# Patient Record
Sex: FEMALE | Race: BLACK OR AFRICAN AMERICAN | Employment: FULL TIME | URBAN - METROPOLITAN AREA
[De-identification: names, ages, dates, MRNs, and addresses within clinical notes are randomized per-mention and may not be internally consistent; named-entity substitution may affect disease eponyms.]

---

## 2019-12-30 ENCOUNTER — HOSPITAL ENCOUNTER (OUTPATIENT)
Dept: PHYSICAL THERAPY | Age: 54
Discharge: HOME | End: 2019-12-30
Payer: COMMERCIAL

## 2019-12-30 PROCEDURE — 97110 THERAPEUTIC EXERCISES: CPT

## 2019-12-30 PROCEDURE — 97161 PT EVAL LOW COMPLEX 20 MIN: CPT

## 2019-12-30 NOTE — PROGRESS NOTES
[address] PHYSICAL THERAPY AT [address] [facility], [address] [place] [address] Way, [address], [address] [place] [address] - Phone: [phone]  Fax: [phone] / [address] [facility]  Patient Name: Chay Louis : 1965   Medical   Diagnosis: Left shoulder pain [M25.512]  Right shoulder pain [M25.511] Treatment Diagnosis: Bilateral Shoulder Instability, Impingement   Onset Date: 10/2019     Referral Source: Adele Elam MD [facility]): 2019   Prior Hospitalization: See medical history Provider #: [id number]   Prior Level of Function: Hx bilateral RCR ,, left dislocation    Comorbidities: Depression,OA, HTN   Medications: Verified on Patient Summary List   The Plan of Care and following information is based on the information from the initial evaluation.   ===========================================================================================  Assessment / key information:  Chay Louis is a 53 y.o.  yo female with Dx of Left shoulder pain [M25.512]  Right shoulder pain [M25.511]. Patient reports onset of symptoms in right shoulder in October of this year after a fall involving her dog. Patient experienced a seconds fall on 19 with right shoulder dislocation. History of bilateral RCR and dislocation. Patient currently rates pain as 10/10 at worst, 2/10 at best, primarily located at bilateral shoulders. Patient complains of difficulty and increase pain with reaching and lifting. Objective Findings:  Right Shoulder AROM:Flexion= 110 degrees, scaption= 93 degrees, ER= 72 degrees, IR to L5 thumb behind back. Left Shoulder AROM:Flexion= 168 degrees, scaption= 155 degrees, ER= 73 degrees, IR to T5 thumb behind back. Manual Muscle Testing: bilateral shoulders grossl 4/5. Special Test: + Yeseniaann Hayden, Speeds and Empty Can bilaterally. DID not test for instability due to recent dislocation.  FOTO Score= 54 points. Pt instructed in HEP and will f/u in clinic for PT.  ===========================================================================================  Eval Complexity: History HIGH Complexity :3+ comorbidities / personal factors will impact the outcome/ POC ;  Examination  HIGH Complexity : 4+ Standardized tests and measures addressing body structure, function, activity limitation and / or participation in recreation ; Presentation LOW Complexity : Stable, uncomplicated ;  Decision Making MEDIUM Complexity : FOTO score of 26-74; Overall Complexity LOW   Problem List: pain affecting function, decrease ROM, decrease strength, edema affecting function, impaired gait/ balance, decrease ADL/ functional abilitiies and decrease activity tolerance   Treatment Plan may include any combination of the following: Therapeutic exercise, Therapeutic activities, Neuromuscular re-education, Physical agent/modality, Manual therapy and Patient education  Patient / Family readiness to learn indicated by: asking questions, trying to perform skills and interest  Persons(s) to be included in education: patient  Barriers to Learning/Limitations: None  Measures taken, if barriers to learning:    Patient Goal (s): \"\"Strengthen, decrease pain. \"   Patient self reported health status: excellent  Rehabilitation Potential: good   Short Term Goals: To be accomplished in  2  weeks:  1. Patient will achieve +2 on GROC to improve reach. 2. Patient will increase FOTO Score to 61 points to improve reach. 3. Patient will report 3/10 pain at worst to improve reach.  Long Term Goals: To be accomplished in  4  weeks:  1. Patient will achieve +4 on GROC to improve reach. 2.  Patient will increase FOTO Score to 68 points to improve reach. 3.  Patient will report 75% improvement in symptoms to improve lifting overhead.   Frequency / Duration:   Patient to be seen  2-3  times per week for 4  weeks:  Patient / Caregiver education and instruction: activity modification and exercises  Therapist Signature: RANCHO Sagastume Date: 04/80/7340   Certification Period: n/a Time: 2:31 PM   ===========================================================================================  I certify that the above Physical Therapy Services are being furnished while the patient is under my care. I agree with the treatment plan and certify that this therapy is necessary. Physician Signature:        Date:       Time:     Please sign and return to [facility] or you may fax the signed copy to [phone]. Thank you.

## 2019-12-30 NOTE — PROGRESS NOTES
PHYSICAL THERAPY - DAILY TREATMENT NOTE      Patient Name: Traci Hobbs        Date: 2019  : 1965   YES Patient  Verified  Visit #:   1   of   5  Insurance: Payor: Aracely Taveras / Plan: Rene Fabian RPN / Product Type: Commerical /      In time: 1:00 Out time: 1:35   Total Treatment Time: 35     Medicare Time/BCBS Tracking (below)   Total Timed Codes (min):  n/a 1:1 Treatment Time:  n/a     TREATMENT AREA = Left shoulder pain [M25.512]  Right shoulder pain [M25.511]     SUBJECTIVE    Pain Level (on 0 to 10 scale):  2   10   Medication Changes/New allergies or changes in medical history, any new surgeries or procedures? NO    If yes, update Summary List   Subjective Functional Status/Changes:  []  No changes reported       See Plan of Care       OBJECTIVE    10 min Therapeutic Exercise:  [x]  See flow sheet   Rationale:      increase ROM and increase strength to improve the patients ability to reach     Billed With/As:   [x] TE   [] TA   [] Neuro   [] Self Care Patient Education: [x] Review HEP    [] Progressed/Changed HEP based on:   [] positioning   [] body mechanics   [] transfers   [] heat/ice application    [] other:      Other Objective/Functional Measures:    See Plan of Care     Post Treatment Pain Level (on 0 to 10) scale:   3   10     ASSESSMENT    Assessment/Changes in Function:     See Plan of Care     []  See Progress Note/Recertification   Patient will continue to benefit from skilled PT services to modify and progress therapeutic interventions, address functional mobility deficits, address ROM deficits, address strength deficits, analyze and address soft tissue restrictions, analyze and cue movement patterns, analyze and modify body mechanics/ergonomics and assess and modify postural abnormalities to attain remaining goals. to attain remaining goals.    Progress toward goals / Updated goals:    See Plan of Care     PLAN    [x]  Upgrade activities as tolerated YES Continue plan of care   []  Discharge due to :    []  Other:      Therapist: Aleta Oro PT    Date: 12/30/2019 Time: 2:19 PM     Future Appointments   Date Time Provider Tucker Lara   1/10/2020  4:00 PM Janet Raman [facility] AT [facility]   1/17/2020  4:00 PM Janet Raman [facility] AT [facility]   1/24/2020  4:00 PM Adeline Beach PT REHAB CENTER AT [facility]   1/31/2020  4:00 PM Adeline Beach PT REHAB CENTER AT [facility]

## 2020-01-10 ENCOUNTER — HOSPITAL ENCOUNTER (OUTPATIENT)
Dept: PHYSICAL THERAPY | Age: 55
Discharge: HOME | End: 2020-01-10
Payer: COMMERCIAL

## 2020-01-10 PROCEDURE — 97110 THERAPEUTIC EXERCISES: CPT

## 2020-01-10 NOTE — PROGRESS NOTES
PHYSICAL THERAPY - DAILY TREATMENT NOTE    Patient Name: Traci Hobbs        Date: 1/10/2020  : 1965    Patient  Verified: YES  Visit #:   2   of   5  Insurance: Payor: Aracely Taveras / Plan: [id number] RPN / Product Type: Commerical /      In time: 4 Out time: 4:40   Total Treatment Time: 40     Medicare/[facility] Time Tracking (below)   Total Timed Codes (min):  - 1:1 Treatment Time:  -     TREATMENT AREA/ DIAGNOSIS = Left shoulder pain [M25.512]  Right shoulder pain [M25.511]    SUBJECTIVE  Pain Level (on 0 to 10 scale):  0  / 10   Medication Changes/New allergies or changes in medical history, any new surgeries or procedures?     NO    If yes, update Summary List   Subjective Functional Status/Changes:  []  No changes reported     Functional improvement no pain at the moment   Functional limitation pain with overhead activities        OBJECTIVE  Modalities Rationale:     decrease edema, decrease inflammation and decrease pain to improve patient's ability to perform ADLs without pain   min [] Estim, type/location:                                      []  att     []  unatt     []  w/US     []  w/ice    []  w/heat    min []  Mechanical Traction: type/lbs                   []  pro   []  sup   []  int   []  cont    []  before manual    []  after manual    min []  Ultrasound, settings/location:      min []  Iontophoresis w/ dexamethasone, location:                                               []  take home patch       []  in clinic   10 min [x]  Ice     []  Heat    location/position:     min []  Vasopneumatic Device, press/temp:     min []  Other:    [x] Skin assessment post-treatment (if applicable):    [x]  intact    []  redness- no adverse reaction     []redness  adverse reaction:          30 min Therapeutic Exercise:  [x]  See flow sheet   Rationale:      increase ROM to improve the patients ability to perform ADLs without pain     Billed With/As:   [x] TE   [] TA   [] Neuro   [] Self Care Patient Education: [x] Review HEP    [] Progressed/Changed HEP based on:   [] positioning   [] body mechanics   [] transfers   [] heat/ice application    [] other:        Other Objective/Functional Measures:    Initiated POC  Full ROM  Educated pt to avoid overhead ADLs, and to use scap retraction when doing overhead ADLs   Post Treatment Pain Level (on 0 to 10) scale:   0  / 10     ASSESSMENT  Assessment/Changes in Function:     No pain with therex, needing VCs for scap retraction     []  See Progress Note/Recertification   Patient will continue to benefit from skilled PT services to modify and progress therapeutic interventions, address functional mobility deficits, address ROM deficits, address strength deficits, analyze and address soft tissue restrictions, analyze and cue movement patterns, analyze and modify body mechanics/ergonomics and assess and modify postural abnormalities to attain remaining goals.    Progress toward goals / Updated goals:    No pain with therex     PLAN  []  Upgrade activities as tolerated YES Continue plan of care   []  Discharge due to :    []  Other:      Therapist: Ricardo Rowe PT    Date: 1/10/2020 Time: 4:34 PM     Future Appointments   Date Time Provider Tucker Lara   1/17/2020  4:00 PM [facility] AT [facility]   1/24/2020  4:00 PM [facility] AT [facility]   1/31/2020  4:00 PM Nida Raman, [facility]IL [facility] AT [facility]

## 2020-01-17 ENCOUNTER — HOSPITAL ENCOUNTER (OUTPATIENT)
Dept: PHYSICAL THERAPY | Age: 55
Discharge: HOME | End: 2020-01-17
Payer: COMMERCIAL

## 2020-01-17 PROCEDURE — 97110 THERAPEUTIC EXERCISES: CPT

## 2020-01-17 NOTE — PROGRESS NOTES
PHYSICAL THERAPY - DAILY TREATMENT NOTE    Patient Name: Dmitriy Casillas        Date: 2020  : 1965    Patient  Verified: YES  Visit #:   3   of   5  Insurance: Payor: Lisha Ruano / Plan: [id number] RPN / Product Type: Commerical /      In time: 3:50 Out time: 4:40   Total Treatment Time: 50     Medicare/[facility] Time Tracking (below)   Total Timed Codes (min):  - 1:1 Treatment Time:  -     TREATMENT AREA/ DIAGNOSIS = Left shoulder pain [M25.512]  Right shoulder pain [M25.511]    SUBJECTIVE  Pain Level (on 0 to 10 scale):  0  / 10   Medication Changes/New allergies or changes in medical history, any new surgeries or procedures?     NO    If yes, update Summary List   Subjective Functional Status/Changes:  []  No changes reported     Functional improvement no pain with ADLs today   Functional limitation pain with overhead ADLs        OBJECTIVE  Modalities Rationale:     decrease edema, decrease inflammation and decrease pain to improve patient's ability to perform ADLs without pain   min [] Estim, type/location:                                      []  att     []  unatt     []  w/US     []  w/ice    []  w/heat    min []  Mechanical Traction: type/lbs                   []  pro   []  sup   []  int   []  cont    []  before manual    []  after manual    min []  Ultrasound, settings/location:      min []  Iontophoresis w/ dexamethasone, location:                                               []  take home patch       []  in clinic   10 min [x]  Ice     []  Heat    location/position:     min []  Vasopneumatic Device, press/temp:     min []  Other:    [x] Skin assessment post-treatment (if applicable):    [x]  intact    []  redness- no adverse reaction     []redness  adverse reaction:          40 min Therapeutic Exercise:  [x]  See flow sheet   Rationale:      increase ROM and increase strength to improve the patients ability to perform ADLs without pain     Billed With/As:   [x] TE   [] TA   [] Neuro   [] Self Care Patient Education: [x] Review HEP    [] Progressed/Changed HEP based on:   [] positioning   [] body mechanics   [] transfers   [] heat/ice application    [] other:        Other Objective/Functional Measures: Added chair dips (lower trap), some pain, so only did 10 reps  Also added horiz abd, pain free    Post Treatment Pain Level (on 0 to 10) scale:   0  / 10     ASSESSMENT  Assessment/Changes in Function:     Pt tolerated therex well, without pain, except for chair dips     []  See Progress Note/Recertification   Patient will continue to benefit from skilled PT services to modify and progress therapeutic interventions, address functional mobility deficits, address ROM deficits, address strength deficits, analyze and address soft tissue restrictions, analyze and cue movement patterns, analyze and modify body mechanics/ergonomics and assess and modify postural abnormalities to attain remaining goals.    Progress toward goals / Updated goals:    Good compliance with HEP     PLAN  []  Upgrade activities as tolerated YES Continue plan of care   []  Discharge due to :    []  Other:      Therapist: Wendy Prater PT    Date: 1/17/2020 Time: 3:54 PM     Future Appointments   Date Time Provider Tucker Lara   1/17/2020  4:00 PM [facility] RE[facility] AT [address]   1/24/2020  4:00 PM Isidro Net RE[facility] AT [address]   1/31/2020  4:00 PM Tanisha Duenas PT REHAB CENTER AT [address]

## 2020-01-24 ENCOUNTER — HOSPITAL ENCOUNTER (OUTPATIENT)
Dept: PHYSICAL THERAPY | Age: 55
Discharge: HOME | End: 2020-01-24
Payer: COMMERCIAL

## 2020-01-24 PROCEDURE — 97110 THERAPEUTIC EXERCISES: CPT

## 2020-01-24 NOTE — PROGRESS NOTES
PHYSICAL THERAPY - DAILY TREATMENT NOTE    Patient Name: Arnulfo Kerr        Date: 2020  : 1965    Patient  Verified: YES  Visit #:   4   of   5  Insurance: Payor: Hola Farr / Plan: [id number] RPN / Product Type: Commerical /      In time: 3:50 Out time: 4:40   Total Treatment Time: 50     Medicare/[facility] Time Tracking (below)   Total Timed Codes (min):  - 1:1 Treatment Time:  -     TREATMENT AREA/ DIAGNOSIS = Left shoulder pain [M25.512]  Right shoulder pain [M25.511]    SUBJECTIVE  Pain Level (on 0 to 10 scale):  2  / 10   Medication Changes/New allergies or changes in medical history, any new surgeries or procedures?     NO    If yes, update Summary List   Subjective Functional Status/Changes:  []  No changes reported     Functional improvement the sessions have not made me sore   Functional limitation I aggravated my L shoulder reaching overhead        OBJECTIVE  Modalities Rationale:     decrease edema, decrease inflammation and decrease pain to improve patient's ability to perform ADLs without pain   min [] Estim, type/location:                                      []  att     []  unatt     []  w/US     []  w/ice    []  w/heat    min []  Mechanical Traction: type/lbs                   []  pro   []  sup   []  int   []  cont    []  before manual    []  after manual    min []  Ultrasound, settings/location:      min []  Iontophoresis w/ dexamethasone, location:                                               []  take home patch       []  in clinic   10 min [x]  Ice     []  Heat    location/position:     min []  Vasopneumatic Device, press/temp:     min []  Other:    [x] Skin assessment post-treatment (if applicable):    [x]  intact    []  redness- no adverse reaction     []redness  adverse reaction:        40 min Therapeutic Exercise:  [x]  See flow sheet   Rationale:      increase ROM and increase strength to improve the patients ability to perform ADLs without pain     Billed With/As:   [x] TE   [] TA   [] Neuro   [] Self Care Patient Education: [x] Review HEP    [] Progressed/Changed HEP based on:   [] positioning   [] body mechanics   [] transfers   [] heat/ice application    [] other:        Other Objective/Functional Measures:    Increased L shoulder pain from lifting at work  Added ice at the end of therex   Post Treatment Pain Level (on 0 to 10) scale:   0  / 10     ASSESSMENT  Assessment/Changes in Function:     Pt with minimal pain with therex     []  See Progress Note/Recertification   Patient will continue to benefit from skilled PT services to modify and progress therapeutic interventions, address functional mobility deficits, address ROM deficits, address strength deficits, analyze and address soft tissue restrictions, analyze and cue movement patterns, analyze and modify body mechanics/ergonomics and assess and modify postural abnormalities to attain remaining goals.    Progress toward goals / Updated goals:    Less pain     PLAN  [x]  Upgrade activities as tolerated YES Continue plan of care   []  Discharge due to :    []  Other:      Therapist: Karlene Lr PT    Date: 1/24/2020 Time: 4:56 PM     Future Appointments   Date Time Provider Tucker Lara   1/31/2020  4:00 PM Javid Flores [facility] AT [facility]

## 2020-01-31 ENCOUNTER — APPOINTMENT (OUTPATIENT)
Dept: PHYSICAL THERAPY | Age: 55
End: 2020-01-31
Payer: COMMERCIAL

## 2020-02-05 ENCOUNTER — HOSPITAL ENCOUNTER (OUTPATIENT)
Dept: PHYSICAL THERAPY | Age: 55
Discharge: HOME | End: 2020-02-05
Payer: COMMERCIAL

## 2020-02-05 PROCEDURE — 97110 THERAPEUTIC EXERCISES: CPT

## 2020-02-05 NOTE — PROGRESS NOTES
Adrienne. Marco Akideandre Patrick [address]  [facility] PHYSICAL THERAPY AT [address] [facility], [address] [place] [address], [address] [place] [address], [address] [place] [address]  Phone: [phone]  Fax: [phone]  DISCHARGE SUMMARY  Patient Name: Roxana Perez : 1965   Treatment/Medical Diagnosis: Left shoulder pain [M25.512]  Right shoulder pain [M25.511]   Referral Source: Shelly Barillas MD     Date of Initial Visit: 20 Attended Visits: 5 Missed Visits: 0     SUMMARY OF TREATMENT  Treatment focused on ROM and strengthening therex to improve bilateral shoulder function. CURRENT STATUS  Patient progressing well. Reporting 80% improvement in symptoms. Patient reporting pain 3/10 at worst in left shoulder with overhead lifting. Current limitations include reach behind back and overhead. Left shoulder AROM: flexion 170 deg, scaption 170 deg, ER 80 deg, IR behind back T5. Right shoulder AROM: flexion 165 deg, scaption 165 deg, ER 90 deg, IR behind back to T9. Patient compliant with HEP. D/C to HEP at this time due to progress toward goals. Previous Goals:  1. Patient will achieve +4 on GROC to improve reach. 2.  Patient will increase FOTO Score to 68 points to improve reach. 3.  Patient will report 75% improvement in symptoms to improve lifting overhead. Prior Level/Current Level:  1) Prior Level: n/a   Current Level: +6   Goal Met? yes  2) Prior Level: FOTO 54 points   Current Level: FOTO 72 points   Goal Met? yes  3) Prior Level: n/a   Current Level: 80% improved   Goal Met? yes    RECOMMENDATIONS  Discontinue therapy. Progressing towards or have reached established goals. If you have any questions/comments please contact us directly at [phone]. Thank you for allowing us to assist in the care of your patient.     Therapist Signature: Odalis Townsend PT Date: 20     Time: 12:20 PM

## 2020-02-05 NOTE — PROGRESS NOTES
PHYSICAL THERAPY - DAILY TREATMENT NOTE      Patient Name: Wilfrid Kahn        Date: 2020  : 1965   YES Patient  Verified  Visit #:   5   of   5  Insurance: Payor: Reese Mackenzie / Plan: [id number] [employer] [address] RPN / Product Type: Commerical /      In time: 12:00 Out time: 12:50   Total Treatment Time: 50     Medicare/[facility] Time Tracking (below)   Total Timed Codes (min):  n/a 1:1 Treatment Time:  n/a     TREATMENT AREA =  Left shoulder pain [M25.512]  Right shoulder pain [M25.511]    SUBJECTIVE    Pain Level (on 0 to 10 scale):  0  / 10   Medication Changes/New allergies or changes in medical history, any new surgeries or procedures? NO    If yes, update Summary List   Subjective Functional Status/Changes:  []  No changes reported       See D/C       OBJECTIVE    50 min Therapeutic Exercise:  [x]  See flow sheet   Rationale:      increase ROM and increase strength to improve the patients ability to reach/lift     Billed With/As:   [x] TE   [] TA   [] Neuro   [] Self Care Patient Education: [x] Review HEP    [] Progressed/Changed HEP based on:   [] positioning   [] body mechanics   [] transfers   [] heat/ice application    [] other:      Other Objective/Functional Measures:    See D/C     Post Treatment Pain Level (on 0 to 10) scale:   0  / 10     ASSESSMENT    Assessment/Changes in Function:     See D/C     []  See Progress Note/Recertification   Patient will continue to benefit from skilled PT services to D/C to HEP to attain remaining goals. to attain remaining goals. Progress toward goals:    See D/C     PLAN    []  Upgrade activities as tolerated YES Continue plan of care   [x]  Discharge due to : Progress toward goals   []  Other:      Therapist: Niesha Thompson PT    Date: 2020 Time: 12:19 PM   No future appointments.

## 2020-07-22 ENCOUNTER — HOSPITAL ENCOUNTER (OUTPATIENT)
Dept: PHYSICAL THERAPY | Age: 55
Discharge: HOME | End: 2020-07-22
Payer: COMMERCIAL

## 2020-07-22 PROCEDURE — 97161 PT EVAL LOW COMPLEX 20 MIN: CPT

## 2020-07-22 PROCEDURE — 97140 MANUAL THERAPY 1/> REGIONS: CPT

## 2020-07-22 NOTE — PROGRESS NOTES
[facility] AT [facility] [address] [place], [address] - Phone: [phone]  Fax: [phone] / [address] [facility]  Patient Name: Jason Guallpa : 1965   Medical   Diagnosis: Right shoulder pain [M25.511] Treatment Diagnosis: R rotator cuff repair and Bankart repair   Onset Date: 20     Referral Source: Maria De Jesus Elizabeth MD [facility]): 2020   Prior Hospitalization: See medical history Provider #: [id number]   Prior Level of Function: History of R shoulder dislocations   Comorbidities: Depression, HBP   Medications: Verified on Patient Summary List   The Plan of Care and following information is based on the information from the initial evaluation.   ==============================================================================  Assessment / key information:   Jason Guallpa is a 53 y.o. female with a chief c/o R shoulder pain, up to 7/10, following large R rotator cuff repair/Bankart repair, on 20. The patient presents into clinic with her sling with abduction pillow, and water resistant bandages . Her R shoulder PROM:  Flex= 70 degrees, Abd = 40 degrees,, ER = 10 degrees and IR = 75 degrees. Post Op precautions were reviewed,a dn HEP was issued. One visit per week to start due to limited visits by [facility]. FOTO score = 36.  The patient would benefit from skilled physical therapy at this time.  ===========================================================================================  Eval Complexity: History LOW Complexity : Zero comorbidities / personal factors that will impact the outcome / POC;  Examination  HIGH Complexity : 4+ Standardized tests and measures addressing body structure, function, activity limitation and / or participation in recreation ; Presentation LOW Complexity : Stable, uncomplicated ;  Decision Making MEDIUM Complexity : FOTO score of 26-74; Overall Complexity LOW   Problem List: pain affecting function, decrease ROM, decrease strength, edema affecting function, decrease ADL/ functional abilitiies, decrease activity tolerance and decrease flexibility/ joint mobility   Treatment Plan may include any combination of the following: Therapeutic exercise, Therapeutic activities, Neuromuscular re-education, Physical agent/modality, Manual therapy, Patient education, Self Care training and Home safety training  Patient / Family readiness to learn indicated by: asking questions, trying to perform skills and interest  Persons(s) to be included in education: patient (P)  Barriers to Learning/Limitations: None  Measures taken, if barriers to learning:    Patient Goal (s): \"return to normal\"   Patient self reported health status: good  Rehabilitation Potential: good   Short Term Goals: To be accomplished in  2  weeks:  1. Decrease pain to < 4/10  2. Pt compliant with HEP and Post Op precautions    Long Term Goals: To be accomplished in  4-5  weeks:  1. All ADL's without R shoulder pain  2. Increase FOTO score to > 50, to indicate increased function  3. Increase R shoulder passive flexion to 90 degrees  4. Increase passive R shoulder ER to er degrees      Frequency / Duration:   Patient to be seen 2-3  times per week for 4-8  weeks:  Patient / Caregiver education and instruction: self care, activity modification, brace/ splint application and exercises  Therapist Signature: Babita Pinzon PT Date: 5/15/8886   Certification Period: NA Time: 10:32 AM   ===========================================================================================  I certify that the above Physical Therapy Services are being furnished while the patient is under my care. I agree with the treatment plan and certify that this therapy is necessary.     Physician Signature:        Date:       Time:     Please sign and return to [facility] or you may fax the signed copy to [phone]. Thank you.

## 2020-07-22 NOTE — PROGRESS NOTES
PHYSICAL THERAPY - DAILY TREATMENT NOTE    Patient Name: Maryjane Crew        Date: 2020  : 1965   YES Patient  Verified  Visit #:     Insurance: Payor: Bharat Vivi / Plan: [employer]uch[employer] RPN / Product Type: Commerical /      In time: 10 Out time: 10:40   Total Treatment Time: 40     Medicare Time Tracking (below)   Total Timed Codes (min):  20 1:1 Treatment Time:  20     TREATMENT AREA =  R shoulder    SUBJECTIVE    Pain Level (on 0 to 10 scale):  3  / 10   Medication Changes/New allergies or changes in medical history, any new surgeries or procedures?     NO    If yes, update Summary List   Subjective Functional Status/Changes:  []  No changes reported     See eval          OBJECTIVE  Modalities Rationale:     decrease edema, decrease inflammation and decrease pain to improve patient's ability to perform ADLs without pain1   min [] Estim, type/location:                                      []  att     []  unatt     []  w/US     []  w/ice    []  w/heat    min []  Mechanical Traction: type/lbs                   []  pro   []  sup   []  int   []  cont    []  before manual    []  after manual    min []  Ultrasound, settings/location:      min []  Iontophoresis w/ dexamethasone, location:                                               []  take home patch       []  in clinic   10 min [x]  Ice     []  Heat    location/position:     min []  Vasopneumatic Device, press/temp:     min []  Other:    [x] Skin assessment post-treatment (if applicable):    [x]  intact    []  redness- no adverse reaction     []redness  adverse reaction:        5 min Therapeutic Exercise:  [x]  See flow sheet   Rationale:      increase ROM to improve the patients ability to perform ADLs without pain     15 min Manual Therapy: STM to pec/post shoulder, PROM to R shoulder, R trap and levator stretch   Rationale:      decrease pain, increase ROM and increase tissue extensibility to improve patient's ability to perform ADLs without pain      Billed With/As:   [x] TE   [] TA   [] Neuro   [] Self Care Patient Education: [x] Review HEP    [] Progressed/Changed HEP based on:   [] positioning   [] body mechanics   [] transfers   [] heat/ice application    [] other:       min Patient Education:  YES  Reviewed HEP   []  Progressed/Changed HEP based on:         Other Objective/Functional Measures:    See eval     Post Treatment Pain Level (on 0 to 10) scale:   1  / 10     ASSESSMENT    []  See Progress Note/Recertification   Patient will continue to benefit from skilled therapy to address remaining functional deficits: see eval   Progress toward goals / Updated goals:    -     PLAN    [x]  Upgrade activities as tolerated YES Continue plan of care   []  Discharge due to :    []  Other:      Therapist: Jefry Bradford PT    Date: 7/22/2020 Time: 9:11 AM

## 2020-07-28 ENCOUNTER — HOSPITAL ENCOUNTER (OUTPATIENT)
Dept: PHYSICAL THERAPY | Age: 55
Discharge: HOME | End: 2020-07-28
Payer: COMMERCIAL

## 2020-07-28 PROCEDURE — 97110 THERAPEUTIC EXERCISES: CPT

## 2020-07-28 PROCEDURE — 97140 MANUAL THERAPY 1/> REGIONS: CPT

## 2020-07-28 NOTE — PROGRESS NOTES
PHYSICAL THERAPY - DAILY TREATMENT NOTE    Patient Name: Jason Guallpa        Date: 2020  : 1965   yes Patient  Verified  Visit #:   2   of   12  Insurance: Payor: Matthew Stapleton / Plan: Isi Durham RPN / Product Type: Commerical /      In time: 940 Out time: 1025   Total Treatment Time: 45     Medicare/[facility] Time Tracking (below)   Total Timed Codes (min):  35 1:1 Treatment Time:       TREATMENT AREA =  Right shoulder pain [M25.511]    SUBJECTIVE  Pain Level (on 0 to 10 scale):  2  / 10   Medication Changes/New allergies or changes in medical history, any new surgeries or procedures?    no  If yes, update Summary List   Subjective Functional Status/Changes:  []  No changes reported     Pt going to see MD tomorrow. Wants to take the bandages off. OBJECTIVE  Modalities Rationale:     decrease inflammation and decrease pain to improve patient's ability to perform pain free ADLs. min [] Estim, type/location:                                      []  att     []  unatt     []  w/US     []  w/ice    []  w/heat    min []  Mechanical Traction: type/lbs                   []  pro   []  sup   []  int   []  cont    []  before manual    []  after manual    min []  Ultrasound, settings/location:      min []  Iontophoresis w/ dexamethasone, location:                                               []  take home patch       []  in clinic   10 min [x]  Ice     []  Heat    location/position: (R) shoulder, supine. min []  Vasopneumatic Device, press/temp:     min []  Other:    [x] Skin assessment post-treatment (if applicable):    [x]  intact    []  redness- no adverse reaction     []redness  adverse reaction:        15 min Therapeutic Exercise:  [x]  See flow sheet   Rationale:      increase ROM, increase strength and improve coordination to improve the patients ability to perform pain free ADLs. 20 Min Manual Therapy: (R) shoulder PROM. TPR (R) periscapular mms.      Rationale:      decrease pain, increase ROM, increase tissue extensibility and decrease trigger points to improve patient's ability to perform pain free ADLs. Billed With/As:   [x] TE   [] TA   [] Neuro   [] Self Care Patient Education: [x] Review HEP    [] Progressed/Changed HEP based on:   [] positioning   [] body mechanics   [] transfers   [] heat/ice application    [] other:      Other Objective/Functional Measures: Therex per flow sheet. Post Treatment Pain Level (on 0 to 10) scale:   1  / 10     ASSESSMENT  Assessment/Changes in Function:     Good flex/scap/abd/ER ROM for current stage of healing. Minimal pain reported, pt took pain pill prior to session. Compliant with sling. []  See Progress Note/Recertification   Patient will continue to benefit from skilled PT services to modify and progress therapeutic interventions, address functional mobility deficits, address ROM deficits, address strength deficits, analyze and address soft tissue restrictions, analyze and cue movement patterns and analyze and modify body mechanics/ergonomics to attain remaining goals. Progress toward goals / Updated goals:    Initiated therex.       PLAN  [x]  Upgrade activities as tolerated yes Continue plan of care   []  Discharge due to :    []  Other:      Therapist: Antionette Morrell PTA    Date: 7/28/2020 Time: 10:15 AM     Future Appointments   Date Time Provider Tucker Lara   8/4/2020  8:00 AM Ladonna Moreno, PT [facility]   8/11/2020  8:00 AM Ladonna Moreno, PT [facility]   8/18/2020  8:00 AM Ladonna Moreno, PT [facility]

## 2020-08-04 ENCOUNTER — APPOINTMENT (OUTPATIENT)
Dept: PHYSICAL THERAPY | Age: 55
End: 2020-08-04
Payer: COMMERCIAL

## 2020-08-11 ENCOUNTER — HOSPITAL ENCOUNTER (OUTPATIENT)
Dept: PHYSICAL THERAPY | Age: 55
Discharge: HOME | End: 2020-08-11
Payer: COMMERCIAL

## 2020-08-11 PROCEDURE — 97140 MANUAL THERAPY 1/> REGIONS: CPT

## 2020-08-11 NOTE — PROGRESS NOTES
PHYSICAL THERAPY - DAILY TREATMENT NOTE    Patient Name: Claudia Beacon View        Date: 2020  : 1965    Patient  Verified: YES  Visit #:   3   of   12  Insurance: Payor: Aristides Bulls / Plan: [email or web address] RPN / Product Type: Commerical /      In time: 7:55 Out time: 8:30   Total Treatment Time: 35     Medicare/[facility] Time Tracking (below)   Total Timed Codes (min):  25 1:1 Treatment Time:  25     TREATMENT AREA/ DIAGNOSIS = Right shoulder pain [M25.511]    SUBJECTIVE  Pain Level (on 0 to 10 scale):  3  / 10   Medication Changes/New allergies or changes in medical history, any new surgeries or procedures?     NO    If yes, update Summary List   Subjective Functional Status/Changes:  []  No changes reported     Functional improvement -not too much pain   Functional limitation  I cant sleep with my sling on        OBJECTIVE  Modalities Rationale:     decrease edema, decrease inflammation and decrease pain to improve patient's ability to perform ADLs without pain   min [] Estim, type/location:                                      []  att     []  unatt     []  w/US     []  w/ice    []  w/heat    min []  Mechanical Traction: type/lbs                   []  pro   []  sup   []  int   []  cont    []  before manual    []  after manual    min []  Ultrasound, settings/location:      min []  Iontophoresis w/ dexamethasone, location:                                               []  take home patch       []  in clinic   10 min [x]  Ice     []  Heat    location/position:     min []  Vasopneumatic Device, press/temp:     min []  Other:    [x] Skin assessment post-treatment (if applicable):    [x]  intact    []  redness- no adverse reaction     []redness  adverse reaction:        20 min Manual Therapy: STM to pec, R upper arm and neck, PROMto R scapula and GH joint, all planes   Rationale:      decrease pain, increase ROM and increase tissue extensibility to improve patient's ability to perform ADLs without pain    5 min Therapeutic Exercise:  [x]  See flow sheet   Rationale:      increase ROM and increase strength to improve the patients ability to perform ADLs without pain     Billed With/As:   [x] TE   [] TA   [] Neuro   [] Self Care Patient Education: [x] Review HEP    [] Progressed/Changed HEP based on:   [] positioning   [] body mechanics   [] transfers   [] heat/ice application    [] other:        Other Objective/Functional Measures:    PROM: flex/scap = 125, ER = 45, IR = 70, ext = 40, abd = 90. Reviewed HEP and precautions  Advised pt to wear sling at night with pillow under elbow in supine, or at least put pillow under arm      Post Treatment Pain Level (on 0 to 10) scale:   0  / 10     ASSESSMENT  Assessment/Changes in Function:     Low pain levels  Pt does good job relaxing with PROM   []  See Progress Note/Recertification   Patient will continue to benefit from skilled PT services to modify and progress therapeutic interventions, address functional mobility deficits, address ROM deficits, address strength deficits, analyze and address soft tissue restrictions, analyze and cue movement patterns and analyze and modify body mechanics/ergonomics to attain remaining goals.    Progress toward goals / Updated goals:    Good HEP compliance     PLAN  [x]  Upgrade activities as tolerated YES Continue plan of care   []  Discharge due to :    []  Other:      Therapist: Andrei Gutiérrez PT    Date: 8/11/2020 Time: 8:30 AM     Future Appointments   Date Time Provider Tucker Lara   8/13/2020  8:30 AM Clara Cat PT [facility]   8/18/2020  8:00 AM Clara Cat PT [facility]

## 2020-08-13 ENCOUNTER — HOSPITAL ENCOUNTER (OUTPATIENT)
Dept: PHYSICAL THERAPY | Age: 55
Discharge: HOME | End: 2020-08-13
Payer: COMMERCIAL

## 2020-08-13 PROCEDURE — 97110 THERAPEUTIC EXERCISES: CPT

## 2020-08-13 PROCEDURE — 97140 MANUAL THERAPY 1/> REGIONS: CPT

## 2020-08-18 ENCOUNTER — HOSPITAL ENCOUNTER (OUTPATIENT)
Dept: PHYSICAL THERAPY | Age: 55
Discharge: HOME | End: 2020-08-18
Payer: COMMERCIAL

## 2020-08-18 PROCEDURE — 97140 MANUAL THERAPY 1/> REGIONS: CPT

## 2020-08-18 NOTE — PROGRESS NOTES
[facility] AT [address] [facility], [address] [place] [address], [address], [address] [place] [address]  Phone: [phone]  Fax: [phone]  PROGRESS NOTE  Patient Name: Que Barnett : 1965   Treatment/Medical Diagnosis: Right shoulder pain [M25.511]   Referral Source: King Fernanda MD     Date of Initial Visit: 20 Attended Visits: 5 Missed Visits: 0     SUMMARY OF TREATMENT  Manual therapy, including gentle joint mobs, PROM and massage. Scapular, elbow and wrist AROM. Patient education on self care/precautions and HEP. Ice. CURRENT STATUS  The patient is progressing well. Her pain levels are generally low and she has good PROM. Goal/Measure of Progress Goal Met? 1. All ADLs without R shoulder pain   Status at last Eval: Pain up to 7/10 Current Status: Pain up to 6/10 progressing   2. Increase FOTO score to > 50 to indicate increased function   Status at last Eval: 36 Current Status: 48 progressing   3. Increase passive R shoulder flexion to 90 degrees    Status at last Eval: 70 degrees  Current Status:  90 degrees yes   4. Increase R shoulder ER to >= 45 degrees   Status at last Eval: 10 degrees Current Status: 50 degrees yes     New Goals to be achieved in __4-5__  weeks:  1. All ADLs without R shoulder pain > 2/10    2. Increase FOTO score to > 60, to indicate increased function    3. Increase R shoulder active flexion to > 120 degrees to allow overhead ADLs   4. Increase R shoulder active ER to > 60 degrees to allow functional reach behind head (LRF)       RECOMMENDATIONS  Continue PT 2x/week x 4-5 weeks  If you have any questions/comments please contact us directly at [phone]. Thank you for allowing us to assist in the care of your patient. Therapist Signature:  Paul Medina PT Date: 2020     Time: 9:09 AM   NOTE TO PHYSICIAN:  PLEASE COMPLETE THE ORDERS BELOW AND FAX TO   [facility] at [place]: [phone]. If you are unable to process this request in 24 hours please contact our office: [phone].    ___ I have read the above report and request that my patient continue as recommended.   ___ I have read the above report and request that my patient continue therapy with the following changes/special instructions:_________________________________________________   ___ I have read the above report and request that my patient be discharged from therapy.      Physician Signature:                   Date:       Time:

## 2020-08-18 NOTE — PROGRESS NOTES
PHYSICAL THERAPY - DAILY TREATMENT NOTE    Patient Name: Valere Goodpasture        Date: 2020  : 1965    Patient  Verified: YES  Visit #:   5   of   5  Insurance: Payor: Santy Martel / Plan: Torito Mahoney RPN / Product Type: Commerical /      In time: 8 Out time: 8:35   Total Treatment Time: 35     Medicare/[employer] Time Tracking (below)   Total Timed Codes (min):  25 1:1 Treatment Time:  25     TREATMENT AREA/ DIAGNOSIS = Right shoulder pain [M25.511]    SUBJECTIVE  Pain Level (on 0 to 10 scale):  4  / 10   Medication Changes/New allergies or changes in medical history, any new surgeries or procedures?     NO    If yes, update Summary List   Subjective Functional Status/Changes:  []  No changes reported     Functional improvement -   Functional limitation im sore from falling on my arm        OBJECTIVE  Modalities Rationale:     decrease edema, decrease inflammation and decrease pain to improve patient's ability to perform ADLs without pain   min [] Estim, type/location:                                      []  att     []  unatt     []  w/US     []  w/ice    []  w/heat    min []  Mechanical Traction: type/lbs                   []  pro   []  sup   []  int   []  cont    []  before manual    []  after manual    min []  Ultrasound, settings/location:      min []  Iontophoresis w/ dexamethasone, location:                                               []  take home patch       []  in clinic   10 min [x]  Ice     []  Heat    location/position:     min []  Vasopneumatic Device, press/temp:     min []  Other:    [x] Skin assessment post-treatment (if applicable):    [x]  intact    []  redness- no adverse reaction     []redness  adverse reaction:        20 min Manual Therapy: CFM to incisions, STM to R pec, post cuff and upper arm, PROM, all planes, R trap and levator stretch   Rationale:      decrease pain, increase ROM and increase tissue extensibility to improve patient's ability to perform ADLs without pain    5 min Therapeutic Exercise:  [x]  See flow sheet   Rationale:      increase ROM and increase strength to improve the patients ability to perform ADLs without pain     Billed With/As:   [x] TE   [] TA   [] Neuro   [] Self Care Patient Education: [x] Review HEP    [] Progressed/Changed HEP based on:   [] positioning   [] body mechanics   [] transfers   [] heat/ice application    [] other:        Other Objective/Functional Measures:    PROM: flex  =  90 (limited by protocol), abd = 85, ER = 50, IR = 53, ext = 54    Post Treatment Pain Level (on 0 to 10) scale:   1  / 10     ASSESSMENT  Assessment/Changes in Function:     Generally low pain levels     []  See Progress Note/Recertification   Patient will continue to benefit from skilled PT services to modify and progress therapeutic interventions, address functional mobility deficits, address ROM deficits, address strength deficits, analyze and address soft tissue restrictions, analyze and cue movement patterns and analyze and modify body mechanics/ergonomics to attain remaining goals. Progress toward goals / Updated goals:    More movement with pendulum  Good PROM     PLAN  [x]  Upgrade activities as tolerated YES Continue plan of care   []  Discharge due to :    []  Other:      Therapist: Babita Pinzon, PT    Date: 8/18/2020 Time: 8:33 AM     No future appointments.

## 2020-08-27 ENCOUNTER — HOSPITAL ENCOUNTER (OUTPATIENT)
Dept: PHYSICAL THERAPY | Age: 55
Discharge: HOME | End: 2020-08-27
Payer: COMMERCIAL

## 2020-08-27 PROCEDURE — 97110 THERAPEUTIC EXERCISES: CPT

## 2020-08-27 PROCEDURE — 97140 MANUAL THERAPY 1/> REGIONS: CPT

## 2020-08-27 NOTE — PROGRESS NOTES
PHYSICAL THERAPY - DAILY TREATMENT NOTE     Patient Name: Swathi Gotti        Date: 2020  : 1965   YES Patient  Verified  Visit #:   6   of   12  Insurance: Payor: Joe Reid / Plan: Tammy Bunch RPN / Product Type: Commerical /      In time: 845 Out time: 925   Total Treatment Time: 49     Medicare/[facility] Time Tracking (below)   Total Timed Codes (min):   1:1 Treatment Time:       TREATMENT AREA =  Right shoulder pain [M25.511]    SUBJECTIVE    Pain Level (on 0 to 10 scale):  5  / 10   Medication Changes/New allergies or changes in medical history, any new surgeries or procedures? NO    If yes, update Summary List   Subjective Functional Status/Changes:  []  No changes reported   Saw MD.  Discontinued sling. Received new orders to increase PROM. Begin AAROM at end of next 6 weeks.            OBJECTIVE  Modalities Rationale:     decrease inflammation and decrease pain to improve patient's ability to perform ADLs with less pain      min [] Estim, type/location:                                      []  att     []  unatt     []  w/US     []  w/ice    []  w/heat    min []  Mechanical Traction: type/lbs                   []  pro   []  sup   []  int   []  cont    []  before manual    []  after manual    min []  Ultrasound, settings/location:      min []  Iontophoresis w/ dexamethasone, location:                                               []  take home patch       []  in clinic   10 min [x]  Ice     []  Heat    location/position:     min []  Vasopneumatic Device, press/temp:     min []  Other:    [x] Skin assessment post-treatment (if applicable):    [x]  intact    []  redness- no adverse reaction     []redness  adverse reaction:      20 min Manual Therapy: Technique:      [x] S/DTM []IASTM [x]PROM [x] Passive Stretching   [x]manual TPR  [] SOR [] man traction  []Jt manipulation:Gr I [] II []  III [] IV[]   [] OP with REIL    [x] scar msg  Treatment Area:  R shoulder   Rationale:      decrease pain, increase ROM, increase tissue extensibility and decrease trigger points to improve patient's ability to perform ADLs as allowed by protocol    10 min Therapeutic Exercise:  [x]  See flow sheet   Rationale:      increase ROM and increase strength to improve the patients ability to use UE as allowed by protocol       Billed With/As:   [] TE   [] TA   [] Neuro   [] Self Care Patient Education: [x] Review HEP    [] Progressed/Changed HEP based on:   [] positioning   [] body mechanics   [] transfers   [] heat/ice application    [] other:        Other Objective/Functional Measures:    PROM flex approx 130, ER approx 50   Post Treatment Pain Level (on 0 to 10) scale:   0 / 10     ASSESSMENT  Assessment/Changes in Function:      Functional improvement:  inc PROM  Functional limitation:  no functional use R UE           Patient will continue to benefit from skilled PT services to modify and progress therapeutic interventions, address functional mobility deficits, address ROM deficits, address strength deficits and analyze and address soft tissue restrictions to attain remaining goals.    Progress toward goals / Updated goals:    Good Progress to    [] STG    [x] LTG  3 as shown by observation--met         []  See Progress Note/Recertification    PLAN    [x]  Upgrade activities as tolerated {YES) Continue plan of care   []  Discharge due to :    []  Other:      Therapist: El Childress PT    Date: 8/27/2020 Time: 8:43 AM     Future Appointments   Date Time Provider Tucker Lara   8/27/2020  8:45 AM Rocco Montano PT [facility]

## 2020-09-04 ENCOUNTER — HOSPITAL ENCOUNTER (OUTPATIENT)
Dept: PHYSICAL THERAPY | Age: 55
Discharge: HOME | End: 2020-09-04
Payer: COMMERCIAL

## 2020-09-04 PROCEDURE — 97140 MANUAL THERAPY 1/> REGIONS: CPT

## 2020-09-04 PROCEDURE — 97110 THERAPEUTIC EXERCISES: CPT

## 2020-09-04 NOTE — PROGRESS NOTES
PHYSICAL THERAPY - DAILY TREATMENT NOTE    Patient Name: Antonio Perez        Date: 2020  : 1965    Patient  Verified: YES  Visit #:     Insurance: Payor: Santiago Cox / Plan: Bela Guillaume RPN / Product Type: Commerical /      In time: 9:25 Out time: 10   Total Treatment Time: 35     Medicare/[facility] Time Tracking (below)   Total Timed Codes (min):  25 1:1 Treatment Time:  25     TREATMENT AREA/ DIAGNOSIS = Right shoulder pain [M25.511]    SUBJECTIVE  Pain Level (on 0 to 10 scale):  2  / 10   Medication Changes/New allergies or changes in medical history, any new surgeries or procedures?     NO    If yes, update Summary List   Subjective Functional Status/Changes:  []  No changes reported     Functional improvement I fell better without the sling   Functional limitation  Hard to get comfortable sleeping        OBJECTIVE  Modalities Rationale:     decrease edema, decrease inflammation and decrease pain to improve patient's ability to perform ADLs without pain   min [] Estim, type/location:                                      []  att     []  unatt     []  w/US     []  w/ice    []  w/heat    min []  Mechanical Traction: type/lbs                   []  pro   []  sup   []  int   []  cont    []  before manual    []  after manual    min []  Ultrasound, settings/location:      min []  Iontophoresis w/ dexamethasone, location:                                               []  take home patch       []  in clinic   10 min [x]  Ice     []  Heat    location/position:     min []  Vasopneumatic Device, press/temp:     min []  Other:    [x] Skin assessment post-treatment (if applicable):    [x]  intact    []  redness- no adverse reaction     []redness  adverse reaction:        15 min Manual Therapy: STM to R shoulder, TrP release to R infraspinatus, PROM to R HG and scapula   Rationale:      decrease pain, increase ROM and increase tissue extensibility to improve patient's ability to perform ADLs without pain    10 min Therapeutic Exercise:  [x]  See flow sheet   Rationale:      increase ROM and increase strength to improve the patients ability to perform ADLs without pain     Billed With/As:   [x] TE   [] TA   [] Neuro   [] Self Care Patient Education: [x] Review HEP    [] Progressed/Changed HEP based on:   [] positioning   [] body mechanics   [] transfers   [] heat/ice application    [] other:        Other Objective/Functional Measures:    Reviewed sleeping posture and use of pillow under arm  PROM:  Scaption to 120 degrees, ER to 70, IR to 60, full extension   Post Treatment Pain Level (on 0 to 10) scale:   0  / 10     ASSESSMENT  Assessment/Changes in Function:     Good PROM     []  See Progress Note/Recertification   Patient will continue to benefit from skilled PT services to modify and progress therapeutic interventions, address functional mobility deficits, address ROM deficits, address strength deficits, analyze and address soft tissue restrictions, analyze and cue movement patterns and analyze and modify body mechanics/ergonomics to attain remaining goals.    Progress toward goals / Updated goals:    Good PROM     PLAN  [x]  Upgrade activities as tolerated YES Continue plan of care   []  Discharge due to :    []  Other:      Therapist: Abilio Mc PT    Date: 9/4/2020 Time: 9:58 AM     Future Appointments   Date Time Provider Tucker Lara   9/10/2020  8:15 AM Kehinde Cat PT [facility]   9/17/2020  8:15 AM Shawn Laurent PT [facility]   9/23/2020  8:00 AM Shawn Laurent PT [facility]   9/30/2020  8:00 AM Shawn Laurent PT [facility]

## 2020-09-10 ENCOUNTER — HOSPITAL ENCOUNTER (OUTPATIENT)
Dept: PHYSICAL THERAPY | Age: 55
Discharge: HOME | End: 2020-09-10
Payer: COMMERCIAL

## 2020-09-10 PROCEDURE — 97110 THERAPEUTIC EXERCISES: CPT

## 2020-09-10 PROCEDURE — 97140 MANUAL THERAPY 1/> REGIONS: CPT

## 2020-09-10 NOTE — PROGRESS NOTES
PHYSICAL THERAPY - DAILY TREATMENT NOTE     Patient Name: Mar Anders        Date: 9/10/2020  : 1965   YES Patient  Verified  Visit #:     Insurance: Payor: Kristin Oviedo / Plan: Anais Brown RPN / Product Type: Commerical /      In time: 815 Out time: 855   Total Treatment Time: 40     Medicare/[employer] Time Tracking (below)   Total Timed Codes (min):   1:1 Treatment Time:       TREATMENT AREA =  Right shoulder pain [M25.511]    SUBJECTIVE    Pain Level (on 0 to 10 scale):  2-3   10   Medication Changes/New allergies or changes in medical history, any new surgeries or procedures?     NO    If yes, update Summary List   Subjective Functional Status/Changes:  []  No changes reported   Doing OK           OBJECTIVE  Modalities Rationale:     decrease inflammation and decrease pain to improve patient's ability to perform ADLs with less pain      min [] Estim, type/location:                                      []  att     []  unatt     []  w/US     []  w/ice    []  w/heat    min []  Mechanical Traction: type/lbs                   []  pro   []  sup   []  int   []  cont    []  before manual    []  after manual    min []  Ultrasound, settings/location:      min []  Iontophoresis w/ dexamethasone, location:                                               []  take home patch       []  in clinic   10 min [x]  Ice     []  Heat    location/position:     min []  Vasopneumatic Device, press/temp:     min []  Other:    [] Skin assessment post-treatment (if applicable):    []  intact    []  redness- no adverse reaction     []redness  adverse reaction:      15 min Manual Therapy: Technique:      [] S/DTM []IASTM []PROM [] Passive Stretching   []manual TPR  [] SOR [] man traction  []Jt manipulation:Gr I [] II []  III [] IV[]   [] OP with REIL    []   Treatment Area:  R shoulder   Rationale:      decrease pain, increase ROM, increase tissue extensibility and decrease trigger points to improve patient's ability to perform ADLs with less pain    15 min Therapeutic Exercise:  [x]  See flow sheet   Rationale:      increase ROM and increase strength to improve the patients ability to perform ADLs with less pain       Billed With/As:   [] TE   [] TA   [] Neuro   [] Self Care Patient Education: [x] Review HEP    [] Progressed/Changed HEP based on:   [] positioning   [] body mechanics   [] transfers   [] heat/ice application    [] other:        Other Objective/Functional Measures:    PROM ER/ IR > 45 deg, Flex approx 140   Post Treatment Pain Level (on 0 to 10) scale:   0  / 10     ASSESSMENT  Assessment/Changes in Function:      Functional improvement:  progressing PROM; compliant HEP  Functional limitation:  no lifting           Patient will continue to benefit from skilled PT services to modify and progress therapeutic interventions, address functional mobility deficits, address ROM deficits, address strength deficits and analyze and address soft tissue restrictions to attain remaining goals.    Progress toward goals / Updated goals:    LTG 3 met         []  See Progress Note/Recertification    PLAN    [x]  Upgrade activities as tolerated {YES) Continue plan of care   []  Discharge due to :    []  Other:      Therapist: Jacy Figueroa PT    Date: 9/10/2020 Time: 8:13 AM     Future Appointments   Date Time Provider Tucker Lara   9/10/2020  8:15 AM France Santiago PT [facility]   9/17/2020  8:15 AM Doris Heart, PT [facility]   9/23/2020  8:00 AM Doris Heart, PT [facility]   9/30/2020  8:00 AM Doris Heart, PT [facility]

## 2020-09-17 ENCOUNTER — HOSPITAL ENCOUNTER (OUTPATIENT)
Dept: PHYSICAL THERAPY | Age: 55
Discharge: HOME | End: 2020-09-17
Payer: COMMERCIAL

## 2020-09-17 PROCEDURE — 97140 MANUAL THERAPY 1/> REGIONS: CPT

## 2020-09-17 PROCEDURE — 97110 THERAPEUTIC EXERCISES: CPT

## 2020-09-17 NOTE — PROGRESS NOTES
PHYSICAL THERAPY - DAILY TREATMENT NOTE    Patient Name: Mere Zavala        Date: 2020  : 1965    Patient  Verified: YES  Visit #:     Insurance: Payor: Racheal Aceves / Plan: Arvil Phlegm RPN / Product Type: Commerical /      In time: 8:15 Out time: 9:10   Total Treatment Time: 55     Medicare/[facility] Time Tracking (below)   Total Timed Codes (min):  45 1:1 Treatment Time:  45     TREATMENT AREA/ DIAGNOSIS = Right shoulder pain [M25.511]    SUBJECTIVE  Pain Level (on 0 to 10 scale):  3  / 10   Medication Changes/New allergies or changes in medical history, any new surgeries or procedures?     NO    If yes, update Summary List   Subjective Functional Status/Changes:  []  No changes reported     Functional improvement im sleeping better   Functional limitation  I woke up with my arm sore on Monday (20)        OBJECTIVE  Modalities Rationale:     decrease edema, decrease inflammation and decrease pain to improve patient's ability to perform ADLs without pain   min [] Estim, type/location:                                      []  att     []  unatt     []  w/US     []  w/ice    []  w/heat    min []  Mechanical Traction: type/lbs                   []  pro   []  sup   []  int   []  cont    []  before manual    []  after manual    min []  Ultrasound, settings/location:      min []  Iontophoresis w/ dexamethasone, location:                                               []  take home patch       []  in clinic   10 min [x]  Ice     []  Heat    location/position:     min []  Vasopneumatic Device, press/temp:     min []  Other:    [x] Skin assessment post-treatment (if applicable):    [x]  intact    []  redness- no adverse reaction     []redness  adverse reaction:        20 min Manual Therapy: STM to R pec, infraspinatus and R upper arm, PROM and rythmic stabs   Rationale:      decrease pain, increase ROM and increase tissue extensibility to improve patient's ability to perform ADLs without pain    25 min Therapeutic Exercise:  [x]  See flow sheet   Rationale:      increase ROM and increase strength to improve the patients ability to perform ADLs without pain     Billed With/As:   [x] TE   [] TA   [] Neuro   [] Self Care Patient Education: [x] Review HEP    [] Progressed/Changed HEP based on:   [] positioning   [] body mechanics   [] transfers   [] heat/ice application    [] other:        Other Objective/Functional Measures: Added isometrics, with mild pain with abd and ER. Pt advised to not do isometrics at home if the pain is more than 3/10 when doing them  Added AAROM, supine flexion painful and stopped, minimal pain with ER, ext and scap       Post Treatment Pain Level (on 0 to 10) scale:   3  / 10     ASSESSMENT  Assessment/Changes in Function:     See PN     [x]  See Progress Note/Recertification   Patient will continue to benefit from skilled PT services to modify and progress therapeutic interventions, address functional mobility deficits, address ROM deficits, address strength deficits, analyze and address soft tissue restrictions, analyze and cue movement patterns, analyze and modify body mechanics/ergonomics and assess and modify postural abnormalities to attain remaining goals.    Progress toward goals / Updated goals:    See PN     PLAN  [x]  Upgrade activities as tolerated YES Continue plan of care   []  Discharge due to :    []  Other:      Therapist: Danisha Correa PT    Date: 9/17/2020 Time: 9:07 AM     Future Appointments   Date Time Provider Tucker Lara   9/23/2020  8:00 AM Acacia Odonnell PT [facility]   9/30/2020  8:00 AM Acacia Odonnell PT [facility]

## 2020-09-17 NOTE — PROGRESS NOTES
[facility] AT [address] [facility], [address] [place] [address], [address], [address] [place] [address]  Phone: [phone]  Fax: [phone]  PROGRESS NOTE  Patient Name: Severo Em : 1965   Treatment/Medical Diagnosis: Right shoulder pain [M25.511]   Referral Source: Harriet Plata MD     Date of Initial Visit: 20 Attended Visits: 9 Missed Visits: 0     SUMMARY OF TREATMENT  Manual therapy, including gentle joint mobs, PROM and massage. Scapular, elbow and wrist AROM. Patient education on self care/precautions and HEP. Ice. CURRENT STATUS  The patient is progressing well. Her pain levels are generally low and she has good PROM. Today we tried AAROM, supine flexion was moderately painful and stopped, but scaption, ER and ext were tolerated well. Gentle submax/subpain isometrics were also initiated with minimal pain. Pt understands that pain is bad and to be avoided, no active AROM allowed yet and to keep HEP low pain. Goal/Measure of Progress Goal Met? 1. All ADLs without R shoulder pain   Status at last Eval: Pain up to 6/10 Current Status: Pain up to 4/10, but generally less progressing   2. Increase FOTO score to > 50 to indicate increased function   Status at last Eval: 48 (was 36) Current Status: 51 progressing   3. Increase R shoulder passive flexion to > 120 degrees    Status at last Eval: 90 degrees  Current Status:  > 120 degrees yes   4. Increase R shoulder passive  ER to >= 60 degrees   Status at last Eval: 50 degrees Current Status: > 75 degrees yes     New Goals to be achieved in __4-5__  weeks:  1. All ADLs without R shoulder pain > 2/10    2. Increase FOTO score to > 60, to indicate increased function    3. Increase R shoulder AAROM flexion to > 140 degrees to allow overhead ADLs   4.   Pain free submax isometrics for all planes     RECOMMENDATIONS  Continue PT 2x/week x 4-5 weeks, including submax/subpain isometrics and AAROM, holding on AROM. If you have any questions/comments please contact us directly at [phone]. Thank you for allowing us to assist in the care of your patient. Therapist Signature: Apple Tineo PT, MDT Date: 9/17/2020     Time: 9:09 AM   NOTE TO PHYSICIAN:  PLEASE COMPLETE THE ORDERS BELOW AND FAX TO   [facility] [place]: [phone]. If you are unable to process this request in 24 hours please contact our office: [phone].    ___ I have read the above report and request that my patient continue as recommended.   ___ I have read the above report and request that my patient continue therapy with the following changes/special instructions:_________________________________________________   ___ I have read the above report and request that my patient be discharged from therapy.      Physician Signature:                   Date:       Time:

## 2020-09-23 ENCOUNTER — HOSPITAL ENCOUNTER (OUTPATIENT)
Dept: PHYSICAL THERAPY | Age: 55
Discharge: HOME | End: 2020-09-23
Payer: COMMERCIAL

## 2020-09-23 PROCEDURE — 97140 MANUAL THERAPY 1/> REGIONS: CPT

## 2020-09-23 PROCEDURE — 97110 THERAPEUTIC EXERCISES: CPT

## 2020-09-23 NOTE — PROGRESS NOTES
PHYSICAL THERAPY - DAILY TREATMENT NOTE    Patient Name: Brigette Talley        Date: 2020  : 1965    Patient  Verified: YES  Visit #:   10   of   15  Insurance: Payor: Arminda Russell / Plan: Apple Peña / Product Type: Commerical /      In time: 8 Out time: 8:40   Total Treatment Time: 40     Medicare/[employer] Time Tracking (below)   Total Timed Codes (min):  30 1:1 Treatment Time:  30     TREATMENT AREA/ DIAGNOSIS = Right shoulder pain [M25.511]    SUBJECTIVE  Pain Level (on 0 to 10 scale):  3  / 10   Medication Changes/New allergies or changes in medical history, any new surgeries or procedures?     NO    If yes, update Summary List   Subjective Functional Status/Changes:  []  No changes reported     Functional improvement I didn't have increased pain after last visit   Functional limitation it hurts to lift with help (AAROM)        OBJECTIVE  Modalities Rationale:     decrease edema, decrease inflammation and decrease pain to improve patient's ability to perform ADLs without pain   min [] Estim, type/location:                                      []  att     []  unatt     []  w/US     []  w/ice    []  w/heat    min []  Mechanical Traction: type/lbs                   []  pro   []  sup   []  int   []  cont    []  before manual    []  after manual    min []  Ultrasound, settings/location:      min []  Iontophoresis w/ dexamethasone, location:                                               []  take home patch       []  in clinic   10 min [x]  Ice     []  Heat    location/position:     min []  Vasopneumatic Device, press/temp:     min []  Other:    [x] Skin assessment post-treatment (if applicable):    [x]  intact    []  redness- no adverse reaction     []redness  adverse reaction:        10 min Manual Therapy: STM to lateral cuff insertion, PROM to R shoulder, Rythmic stabs   Rationale:      decrease pain, increase ROM and increase tissue extensibility to improve patient's ability to perform ADLs without pain    20 min Therapeutic Exercise:  [x]  See flow sheet   Rationale:      increase ROM and increase strength to improve the patients ability to perform ADLs without pain     Billed With/As:   [x] TE   [] TA   [] Neuro   [] Self Care Patient Education: [x] Review HEP    [] Progressed/Changed HEP based on:   [] positioning   [] body mechanics   [] transfers   [] heat/ice application    [] other:        Other Objective/Functional Measures:    Pt with 110 degrees of AAROM on pulley, 90 degrees supine with wand  65 degrees of passive ER and IR with no pain   Post Treatment Pain Level (on 0 to 10) scale:   0  / 10     ASSESSMENT  Assessment/Changes in Function:     limproving AAROM and PROM  Able to tolerate AAROM flex and scap and pulley (all hurt last time)   []  See Progress Note/Recertification   Patient will continue to benefit from skilled PT services to modify and progress therapeutic interventions, address functional mobility deficits, address ROM deficits, address strength deficits, analyze and address soft tissue restrictions, analyze and cue movement patterns and analyze and modify body mechanics/ergonomics to attain remaining goals.    Progress toward goals / Updated goals:    Less pain     PLAN  [x]  Upgrade activities as tolerated YES Continue plan of care   []  Discharge due to :    []  Other:      Therapist: Jefry Bradford PT    Date: 9/23/2020 Time: 8:31 AM     Future Appointments   Date Time Provider Tucker Lara   9/30/2020  8:00 AM Elise Chavez, PT [facility] [address]

## 2020-09-30 ENCOUNTER — HOSPITAL ENCOUNTER (OUTPATIENT)
Dept: PHYSICAL THERAPY | Age: 55
Discharge: HOME | End: 2020-09-30
Payer: COMMERCIAL

## 2020-09-30 PROCEDURE — 97140 MANUAL THERAPY 1/> REGIONS: CPT

## 2020-09-30 PROCEDURE — 97110 THERAPEUTIC EXERCISES: CPT

## 2020-09-30 NOTE — PROGRESS NOTES
PHYSICAL THERAPY - DAILY TREATMENT NOTE    Patient Name: Marcos Machado        Date: 2020  : 1965    Patient  Verified: YES  Visit #:   55   of   15  Insurance: Payor: Shara Avila / Plan: Tatyana Schmidt RPN / Product Type: Commerical /      In time: 8 Out time: 8:50   Total Treatment Time: 50     Medicare/[facility] Time Tracking (below)   Total Timed Codes (min):  40 1:1 Treatment Time:  40     TREATMENT AREA/ DIAGNOSIS = Right shoulder pain [M25.511]    SUBJECTIVE  Pain Level (on 0 to 10 scale):  2  / 10   Medication Changes/New allergies or changes in medical history, any new surgeries or procedures?     NO    If yes, update Summary List   Subjective Functional Status/Changes:  []  No changes reported     Functional improvement not much pain   Functional limitation  My L shoulder hurts form using it all the time        OBJECTIVE  Modalities Rationale:     decrease edema, decrease inflammation and decrease pain to improve patient's ability to perform ADLs without pain   min [] Estim, type/location:                                      []  att     []  unatt     []  w/US     []  w/ice    []  w/heat    min []  Mechanical Traction: type/lbs                   []  pro   []  sup   []  int   []  cont    []  before manual    []  after manual    min []  Ultrasound, settings/location:      min []  Iontophoresis w/ dexamethasone, location:                                               []  take home patch       []  in clinic   10 min [x]  Ice     []  Heat    location/position:     min []  Vasopneumatic Device, press/temp:     min []  Other:    [x] Skin assessment post-treatment (if applicable):    [x]  intact    []  redness- no adverse reaction     []redness  adverse reaction:        15 min Manual Therapy: STM to R shoulder, upper arm and pec, levator and trap, rythmic stabs, GD III inf/;post GH mobs and PROM   Rationale:      decrease pain, increase ROM and increase tissue extensibility to improve patient's ability to perform ADLs without pain    25 min Therapeutic Exercise:  [x]  See flow sheet   Rationale:      increase ROM and increase strength to improve the patients ability to perform ADLs without pain     Billed With/As:   [x] TE   [] TA   [] Neuro   [] Self Care Patient Education: [x] Review HEP    [] Progressed/Changed HEP based on:   [] positioning   [] body mechanics   [] transfers   [] heat/ice application    [] other:        Other Objective/Functional Measures:    Pt with > 75 degrees of passive ER and IR, > 120 degrees of flexion PROM  Less pain with isometrics and pulley    Post Treatment Pain Level (on 0 to 10) scale:   0  / 10     ASSESSMENT  Assessment/Changes in Function:     Pt needing minimal VCs will progress protocol next visit     []  See Progress Note/Recertification   Patient will continue to benefit from skilled PT services to modify and progress therapeutic interventions, address functional mobility deficits, address ROM deficits, address strength deficits, analyze and address soft tissue restrictions, analyze and cue movement patterns, analyze and modify body mechanics/ergonomics and assess and modify postural abnormalities to attain remaining goals. Progress toward goals / Updated goals:    Low pain levels  Very good PROM     PLAN  [x]  Upgrade activities as tolerated YES Continue plan of care   []  Discharge due to :    []  Other:      Therapist: Maribell Poon PT    Date: 9/30/2020 Time: 9:07 AM     No future appointments.

## 2020-10-15 ENCOUNTER — HOSPITAL ENCOUNTER (OUTPATIENT)
Dept: PHYSICAL THERAPY | Age: 55
Discharge: HOME | End: 2020-10-15
Payer: COMMERCIAL

## 2020-10-15 PROCEDURE — 97140 MANUAL THERAPY 1/> REGIONS: CPT

## 2020-10-15 PROCEDURE — 97110 THERAPEUTIC EXERCISES: CPT

## 2020-10-15 NOTE — PROGRESS NOTES
PHYSICAL THERAPY - DAILY TREATMENT NOTE    Patient Name: Brandan Calvert        Date: 10/15/2020  : 1965    Patient  Verified: YES  Visit #:     Insurance: Payor: Ramonita Goodson / Plan: Stefano OLVERA / Product Type: Commerical /      In time: 7:45 Out time: 8;45   Total Treatment Time: 60     Medicare Time Tracking (below)   Total Timed Codes (min):  - 1:1 Treatment Time:  -     TREATMENT AREA/ DIAGNOSIS = Right shoulder pain [M25.511]    SUBJECTIVE  Pain Level (on 0 to 10 scale):  0  / 10   Medication Changes/New allergies or changes in medical history, any new surgeries or procedures?     NO    If yes, update Summary List   Subjective Functional Status/Changes:  []  No changes reported     Pt reports not being able to reach overhead      OBJECTIVE  Modalities Rationale:     decrease inflammation and decrease pain to improve patient's ability to perform ADLs without pain     min [] Estim, type/location:                                      []  att     []  unatt     []  w/US     []  w/ice    []  w/heat    min []  Mechanical Traction: type/lbs                   []  pro   []  sup   []  int   []  cont    []  before manual    []  after manual    min []  Ultrasound, settings/location:      min []  Iontophoresis w/ dexamethasone, location:                                               []  take home patch       []  in clinic   10 min [x]  Ice     []  Heat    location/position: R shoulder    min []  Vasopneumatic Device, press/temp:     min []  Other:    [] Skin assessment post-treatment (if applicable):    []  intact    []  redness- no adverse reaction     []redness  adverse reaction:        30 min Therapeutic Exercise:  [x]  See flow sheet   Rationale:      increase ROM, increase strength and improve coordination to improve the patients ability to perform ADLs without pain       10 min Manual Therapy: PROM and STM to R shoulder   Rationale:      decrease pain, increase ROM and increase tissue extensibility to improve patient's ability to perform ADLs without pain    Billed With/As:   [x] TE   [] TA   [] Neuro   [] Self Care Patient Education: [x] Review HEP    [] Progressed/Changed HEP based on:   [] positioning   [] body mechanics   [] transfers   [] heat/ice application    [] other:        Other Objective/Functional Measures:    Full PROM  Unable to break 90deg of flexion with AROM   Post Treatment Pain Level (on 0 to 10) scale:   0  / 10     ASSESSMENT  Assessment/Changes in Function:     Pt showing decreased AROM needed for ADLs. []  See Progress Note/Recertification   Patient will continue to benefit from skilled PT services to modify and progress therapeutic interventions and address functional mobility deficits to attain remaining goals.    Progress toward goals / Updated goals:    Slow Progress to    [] STG    [x] LTG  1 as shown by decreased AROM needed for ADLs     PLAN  [x]  Upgrade activities as tolerated YES Continue plan of care   []  Discharge due to :    []  Other:      Therapist: Janet Ya DPT     Date: 10/15/2020 Time: 8:37 AM        Future Appointments   Date Time Provider Tucker Lara   10/19/2020  8:45 AM Blanquita Perez PT [facility]

## 2020-10-19 ENCOUNTER — HOSPITAL ENCOUNTER (OUTPATIENT)
Dept: PHYSICAL THERAPY | Age: 55
Discharge: HOME | End: 2020-10-19
Payer: COMMERCIAL

## 2020-10-19 PROCEDURE — 97110 THERAPEUTIC EXERCISES: CPT

## 2020-10-19 PROCEDURE — 97140 MANUAL THERAPY 1/> REGIONS: CPT

## 2020-10-19 NOTE — PROGRESS NOTES
PHYSICAL THERAPY - DAILY TREATMENT NOTE     Patient Name: Joseph Cary        Date: 10/19/2020  : 1965   YES Patient  Verified  Visit #:   15   of   13  Insurance: Payor: Ezekiel Black / Plan: Mae Smith RPN / Product Type: Commerical /      In time: 845 Out time: 940   Total Treatment Time: 55     Medicare/[facility] Time Tracking (below)   Total Timed Codes (min):   1:1 Treatment Time:       TREATMENT AREA =  Right shoulder pain [M25.511]    SUBJECTIVE    Pain Level (on 0 to 10 scale):  2  / 10   Medication Changes/New allergies or changes in medical history, any new surgeries or procedures? NO    If yes, update Summary List   Subjective Functional Status/Changes:  []  No changes reported   Overall 50% improved. Overall decreased pain levels. She is able to reach better, but reports continued limitation.   She is able to independently dress and perform hygiene/ grooming, but has modified the way she does things, such as dropping head down vs reaching arm up           OBJECTIVE  Modalities Rationale:     decrease inflammation and decrease pain to improve patient's ability to perform ADLs      min [] Estim, type/location:                                      []  att     []  unatt     []  w/US     []  w/ice    []  w/heat    min []  Mechanical Traction: type/lbs                   []  pro   []  sup   []  int   []  cont    []  before manual    []  after manual    min []  Ultrasound, settings/location:      min []  Iontophoresis w/ dexamethasone, location:                                               []  take home patch       []  in clinic   10 min [x]  Ice     []  Heat    location/position:     min []  Vasopneumatic Device, press/temp:     min []  Other:    [x] Skin assessment post-treatment (if applicable):    [x]  intact    [x]  redness- no adverse reaction     []redness  adverse reaction:      15 min Manual Therapy: Technique:      [x] S/DTM []IASTM []PROM [x] Passive Stretching   [x]manual TPR  [] SOR [] man traction  []Jt manipulation:Gr I [] II []  III [] IV[]   [] OP with REIL    []   Treatment Area:  R shoulder   Rationale:      decrease pain, increase ROM, increase tissue extensibility and decrease trigger points to improve patient's ability to perform ADLs/ reach    30 min Therapeutic Exercise:  [x]  See flow sheet   Rationale:      increase ROM and increase strength to improve the patients ability to perform ADLs/ reach         Billed With/As:   [x] TE   [] TA   [] Neuro   [] Self Care Patient Education: [x] Review HEP    [] Progressed/Changed HEP based on:   [] positioning   [] body mechanics   [] transfers   [] heat/ice application    [] other:        Other Objective/Functional Measures:    AROM: flex= 65, scap= 68, ER= 70    MMT= 2+/5    Progressed HEP per flow sheet   Post Treatment Pain Level (on 0 to 10) scale:   0  / 10     ASSESSMENT  Assessment/Changes in Function:      See subjective/ FOTO           Patient will continue to benefit from skilled PT services to modify and progress therapeutic interventions, address functional mobility deficits, address ROM deficits, address strength deficits, analyze and address soft tissue restrictions and analyze and cue movement patterns to attain remaining goals. Progress toward goals / Updated goals:    Fair Progress to    [] STG    [x] LTG  3 as shown by ROM measures         []  See Progress Note/Recertification    PLAN    [x]  Upgrade activities as tolerated {YES) Continue plan of care   []  Discharge due to :    []  Other:      Therapist: Sia Verma, PT    Date: 10/19/2020 Time: 8:50 AM   No future appointments.

## 2020-10-28 ENCOUNTER — HOSPITAL ENCOUNTER (OUTPATIENT)
Dept: PHYSICAL THERAPY | Age: 55
Discharge: HOME | End: 2020-10-28
Payer: COMMERCIAL

## 2020-10-28 PROCEDURE — 97140 MANUAL THERAPY 1/> REGIONS: CPT

## 2020-10-28 PROCEDURE — 97110 THERAPEUTIC EXERCISES: CPT

## 2020-10-28 NOTE — PROGRESS NOTES
PHYSICAL THERAPY - DAILY TREATMENT NOTE     Patient Name: Omid Worthy        Date: 10/28/2020  : 1965   YES Patient  Verified  Visit #:   15   of   21  Insurance: Payor: Quan Level / Plan: Marcelino Mccoy RPN / Product Type: Commerical /      In time: 845 Out time: 436   Total Treatment Time: 50     Medicare/[employer] Time Tracking (below)   Total Timed Codes (min):   1:1 Treatment Time:       TREATMENT AREA =  Right shoulder pain [M25.511]    SUBJECTIVE    Pain Level (on 0 to 10 scale):  2  / 10   Medication Changes/New allergies or changes in medical history, any new surgeries or procedures? NO    If yes, update Summary List   Subjective Functional Status/Changes:  []  No changes reported   Achy/ sore.   Trying to slowly use the arm more           OBJECTIVE  Modalities Rationale:     decrease inflammation and decrease pain to improve patient's ability to perform ADLs      min [] Estim, type/location:                                      []  att     []  unatt     []  w/US     []  w/ice    []  w/heat    min []  Mechanical Traction: type/lbs                   []  pro   []  sup   []  int   []  cont    []  before manual    []  after manual    min []  Ultrasound, settings/location:      min []  Iontophoresis w/ dexamethasone, location:                                               []  take home patch       []  in clinic   10 min [x]  Ice     []  Heat    location/position:     min []  Vasopneumatic Device, press/temp:     min []  Other:    [x] Skin assessment post-treatment (if applicable):    [x]  intact    [x]  redness- no adverse reaction     []redness  adverse reaction:      10 min Manual Therapy: Technique:      [x] S/DTM []IASTM []PROM [x] Passive Stretching   [x]manual TPR  [] SOR [] man traction  []Jt manipulation:Gr I [] II []  III [] IV[]   [] OP with REIL    []   Treatment Area:  R shoulder/ scap area   Rationale:      decrease pain, increase ROM, increase tissue extensibility and decrease trigger points to improve patient's ability to reach/ lift/ perform ADLs    30 min Therapeutic Exercise:  [x]  See flow sheet   Rationale:      increase ROM and increase strength to improve the patients ability to reach and lift for ADLs         Billed With/As:   [] TE   [] TA   [] Neuro   [] Self Care Patient Education: [x] Review HEP    [] Progressed/Changed HEP based on:   [] positioning   [] body mechanics   [] transfers   [] heat/ice application    [] other:        Other Objective/Functional Measures:    Inc reps as noted on flow sheet  Scap strength= 3-/5  Full PROM   Post Treatment Pain Level (on 0 to 10) scale:   0  / 10     ASSESSMENT  Assessment/Changes in Function:      Functional improvement:  using arm more for ADLs  Functional limitation:  weak/ dec endurance           Patient will continue to benefit from skilled PT services to modify and progress therapeutic interventions, address functional mobility deficits, address ROM deficits, address strength deficits, analyze and address soft tissue restrictions and analyze and cue movement patterns to attain remaining goals.    Progress toward goals / Updated goals:    Progressing strength/ endurance for ADLs         []  See Progress Note/Recertification    PLAN    [x]  Upgrade activities as tolerated {YES) Continue plan of care   []  Discharge due to :    []  Other:      Therapist: Denys Pan PT    Date: 10/28/2020 Time: 8:53 AM     Future Appointments   Date Time Provider Tucker Lara   11/3/2020  7:00 AM Dustin Dan [facility]   11/10/2020  7:45 AM Dustin Dan [facility]   11/17/2020  7:45 AM Dustin Dan [facility]   11/24/2020  7:45 AM Dustin Dan [facility]

## 2020-11-03 ENCOUNTER — HOSPITAL ENCOUNTER (OUTPATIENT)
Dept: PHYSICAL THERAPY | Age: 55
Discharge: HOME | End: 2020-11-03
Payer: COMMERCIAL

## 2020-11-03 PROCEDURE — 97140 MANUAL THERAPY 1/> REGIONS: CPT

## 2020-11-03 PROCEDURE — 97110 THERAPEUTIC EXERCISES: CPT

## 2020-11-03 NOTE — PROGRESS NOTES
PHYSICAL THERAPY - DAILY TREATMENT NOTE    Patient Name: María Thacker        Date: 11/3/2020  : 1965    Patient  Verified: YES  Visit #:     Insurance: Payor: Elsy Pack / Plan: [id number] [employer] RPN / Product Type: Commerical /      In time: 6:55 Out time: 8:00   Total Treatment Time: 65     Medicare Time Tracking (below)   Total Timed Codes (min):  - 1:1 Treatment Time:  -     TREATMENT AREA/ DIAGNOSIS = Right shoulder pain [M25.511]    SUBJECTIVE  Pain Level (on 0 to 10 scale):  2  / 10   Medication Changes/New allergies or changes in medical history, any new surgeries or procedures?     NO    If yes, update Summary List   Subjective Functional Status/Changes:  []  No changes reported     Pt reports still having some slight regular discomfort and difficulty reaching overhead       OBJECTIVE  Modalities Rationale:     decrease inflammation and decrease pain to improve patient's ability to perform ADLs without pain     min [] Estim, type/location:                                      []  att     []  unatt     []  w/US     []  w/ice    []  w/heat    min []  Mechanical Traction: type/lbs                   []  pro   []  sup   []  int   []  cont    []  before manual    []  after manual    min []  Ultrasound, settings/location:      min []  Iontophoresis w/ dexamethasone, location:                                               []  take home patch       []  in clinic   10 min [x]  Ice     []  Heat    location/position: R shoulder    min []  Vasopneumatic Device, press/temp:     min []  Other:    [] Skin assessment post-treatment (if applicable):    []  intact    []  redness- no adverse reaction     []redness  adverse reaction:        45 min Therapeutic Exercise:  [x]  See flow sheet   Rationale:      increase ROM and increase strength to improve the patients ability to perform ADLs without pain       10 min Manual Therapy: Trigger point release to pec minor/major and posterior    Rationale:      decrease pain, increase ROM and increase tissue extensibility to improve patient's ability to perform ADLs without pain    Billed With/As:   [x] TE   [] TA   [] Neuro   [] Self Care Patient Education: [x] Review HEP    [] Progressed/Changed HEP based on:   [] positioning   [] body mechanics   [] transfers   [] heat/ice application    [] other:        Other Objective/Functional Measures:    Pt has R shoulder ROM WFL. Post Treatment Pain Level (on 0 to 10) scale:   2  / 10     ASSESSMENT  Assessment/Changes in Function:     Pt showing improved overall strength needed for ADLs     []  See Progress Note/Recertification   Patient will continue to benefit from skilled PT services to modify and progress therapeutic interventions, address functional mobility deficits, address ROM deficits, address strength deficits, analyze and address soft tissue restrictions and analyze and cue movement patterns to attain remaining goals.    Progress toward goals / Updated goals:    Good Progress to    [] STG    [x] LTG  1 as shown by improved pain levels with ADLs     PLAN  [x]  Upgrade activities as tolerated YES Continue plan of care   []  Discharge due to :    []  Other:      Therapist: Gracy Ray DPT     Date: 11/3/2020 Time: 6:58 AM        Future Appointments   Date Time Provider Tucker Lara   11/3/2020  7:00 AM Jefferson Fischer [facility]   11/10/2020  7:45 AM Jefferson Fischer [facility]   11/17/2020  7:45 AM Jefferson Fischer [facility]   11/24/2020  7:45 AM Jefferson Fischer [facility]

## 2020-11-10 ENCOUNTER — HOSPITAL ENCOUNTER (OUTPATIENT)
Dept: PHYSICAL THERAPY | Age: 55
Discharge: HOME | End: 2020-11-10
Payer: COMMERCIAL

## 2020-11-10 PROCEDURE — 97110 THERAPEUTIC EXERCISES: CPT

## 2020-11-10 PROCEDURE — 97140 MANUAL THERAPY 1/> REGIONS: CPT

## 2020-11-10 NOTE — PROGRESS NOTES
PHYSICAL THERAPY - DAILY TREATMENT NOTE    Patient Name: Bigg Herbert        Date: 11/10/2020  : 1965    Patient  Verified: YES  Visit #:      20  Insurance: Payor: Trell De Jesus / Plan: Orlando Westbrook RPN / Product Type: Commerical /      In time: 7:45 Out time: 8:50   Total Treatment Time: 65     Medicare Time Tracking (below)   Total Timed Codes (min):  - 1:1 Treatment Time:  -     TREATMENT AREA/ DIAGNOSIS = Right shoulder pain [M25.511]    SUBJECTIVE  Pain Level (on 0 to 10 scale):  2  / 10   Medication Changes/New allergies or changes in medical history, any new surgeries or procedures? NO    If yes, update Summary List   Subjective Functional Status/Changes:  []  No changes reported     Pt reports difficulty with overhead reaching. OBJECTIVE  Modalities Rationale:     decrease inflammation and decrease pain to improve patient's ability to perform ADLs without pain     min [] Estim, type/location:                                      []  att     []  unatt     []  w/US     []  w/ice    []  w/heat    min []  Mechanical Traction: type/lbs                   []  pro   []  sup   []  int   []  cont    []  before manual    []  after manual    min []  Ultrasound, settings/location:      min []  Iontophoresis w/ dexamethasone, location:                                               []  take home patch       []  in clinic   10 min [x]  Ice     []  Heat    location/position: R shoulder    min []  Vasopneumatic Device, press/temp:     min []  Other:    [] Skin assessment post-treatment (if applicable):    []  intact    []  redness- no adverse reaction     []redness  adverse reaction:        45 min Therapeutic Exercise:  [x]  See flow sheet   Rationale:      increase strength and improve coordination to improve the patients ability to perform ADLs without pain       10 min Manual Therapy: PROM to R shoulder. Trigger point release to pec minor/major and posterior cuff.    Rationale:      increase ROM and increase tissue extensibility to improve patient's ability to perform ADLs without pain      Billed With/As:   [x] TE   [] TA   [] Neuro   [] Self Care Patient Education: [x] Review HEP    [] Progressed/Changed HEP based on:   [] positioning   [] body mechanics   [] transfers   [] heat/ice application    [] other:        Other Objective/Functional Measures:    Pt has PROM WFL. Post Treatment Pain Level (on 0 to 10) scale:   0  / 10     ASSESSMENT  Assessment/Changes in Function:     Pt showing improved scapulohumeral rhythm but still limited by pain      []  See Progress Note/Recertification   Patient will continue to benefit from skilled PT services to modify and progress therapeutic interventions, address functional mobility deficits, address ROM deficits, address strength deficits, analyze and address soft tissue restrictions and analyze and cue movement patterns to attain remaining goals.    Progress toward goals / Updated goals:    Good Progress to    [] STG    [x] LTG  1 as shown by overall improved ability to perform ADLs     PLAN  [x]  Upgrade activities as tolerated YES Continue plan of care   []  Discharge due to :    []  Other:      Therapist: Yevgeniy Root DPT     Date: 11/10/2020 Time: 10:34 AM        Future Appointments   Date Time Provider Tucker Lara   11/17/2020  7:45 AM Georgette Older [facility]   11/24/2020  7:45 AM [facility]

## 2020-11-17 ENCOUNTER — HOSPITAL ENCOUNTER (OUTPATIENT)
Dept: PHYSICAL THERAPY | Age: 55
Discharge: HOME | End: 2020-11-17
Payer: COMMERCIAL

## 2020-11-17 PROCEDURE — 97140 MANUAL THERAPY 1/> REGIONS: CPT

## 2020-11-17 PROCEDURE — 97110 THERAPEUTIC EXERCISES: CPT

## 2020-11-17 NOTE — PROGRESS NOTES
PHYSICAL THERAPY - DAILY TREATMENT NOTE    Patient Name: Kwame Bird        Date: 2020  : 1965    Patient  Verified: YES  Visit #:     Insurance: Payor: Alexa Ulloa / Plan: [id number] [employer] RPN / Product Type: Commerical /      In time: 7:45 Out time: 8:40   Total Treatment Time: 55     Medicare Time Tracking (below)   Total Timed Codes (min):  - 1:1 Treatment Time:  -     TREATMENT AREA/ DIAGNOSIS = Right shoulder pain [M25.511]    SUBJECTIVE  Pain Level (on 0 to 10 scale):  2  / 10   Medication Changes/New allergies or changes in medical history, any new surgeries or procedures? NO    If yes, update Summary List   Subjective Functional Status/Changes:  []  No changes reported     Pt reports she is able to reach overhead a little better. Still not normal though      OBJECTIVE  Modalities Rationale:     decrease inflammation and decrease pain to improve patient's ability to perform ADLs without pain     min [] Estim, type/location:                                      []  att     []  unatt     []  w/US     []  w/ice    []  w/heat    min []  Mechanical Traction: type/lbs                   []  pro   []  sup   []  int   []  cont    []  before manual    []  after manual    min []  Ultrasound, settings/location:      min []  Iontophoresis w/ dexamethasone, location:                                               []  take home patch       []  in clinic   10 min [x]  Ice     []  Heat    location/position: r shoulder    min []  Vasopneumatic Device, press/temp:     min []  Other:    [] Skin assessment post-treatment (if applicable):    []  intact    []  redness- no adverse reaction     []redness  adverse reaction:        35 min Therapeutic Exercise:  [x]  See flow sheet   Rationale:      increase ROM, increase strength and improve coordination to improve the patients ability to perform ADLs without pain       10 min Manual Therapy: PROM to R shoulder.  Trigger point release to pec minor/major and posterior cuff. Rationale:      increase ROM and increase tissue extensibility to improve patient's ability to perform ADLs without pain      Billed With/As:   [x] TE   [] TA   [] Neuro   [] Self Care Patient Education: [x] Review HEP    [] Progressed/Changed HEP based on:   [] positioning   [] body mechanics   [] transfers   [] heat/ice application    [] other:        Other Objective/Functional Measures:    Pt had some guarding with PROM today. Post Treatment Pain Level (on 0 to 10) scale:   0  / 10     ASSESSMENT  Assessment/Changes in Function:     Pt had improved scapulohumeral rhythm today with reaching overhead. Pt showing improvement based on increased resistance with multiple exercises     []  See Progress Note/Recertification   Patient will continue to benefit from skilled PT services to modify and progress therapeutic interventions, address functional mobility deficits, address ROM deficits, address strength deficits, analyze and address soft tissue restrictions and analyze and cue movement patterns to attain remaining goals.    Progress toward goals / Updated goals:    Good Progress to    [] STG    [x] LTG  1 as shown by improved overall pain levels with ADLs     PLAN  [x]  Upgrade activities as tolerated YES Continue plan of care   []  Discharge due to :    []  Other:      Therapist: Yuli Mazariegos DPT     Date: 11/17/2020 Time: 8:06 AM        Future Appointments   Date Time Provider Tucker Lara   11/24/2020  [facility]

## 2020-11-24 ENCOUNTER — HOSPITAL ENCOUNTER (OUTPATIENT)
Dept: PHYSICAL THERAPY | Age: 55
Discharge: HOME | End: 2020-11-24
Payer: COMMERCIAL

## 2020-11-24 PROCEDURE — 97110 THERAPEUTIC EXERCISES: CPT

## 2020-11-24 PROCEDURE — 97140 MANUAL THERAPY 1/> REGIONS: CPT

## 2020-11-24 NOTE — PROGRESS NOTES
PHYSICAL THERAPY - DAILY TREATMENT NOTE    Patient Name: Natalee Pal        Date: 2020  : 1965    Patient  Verified: YES  Visit #:     Insurance: Payor: Sanjana Cardona / Plan: Jeff OLVERA / Product Type: Commerical /      In time: 7:40 Out time: 8:45   Total Treatment Time: 65     Medicare Time Tracking (below)   Total Timed Codes (min):  - 1:1 Treatment Time:  -     TREATMENT AREA/ DIAGNOSIS = Right shoulder pain [M25.511]    SUBJECTIVE  Pain Level (on 0 to 10 scale):  0  / 10   Medication Changes/New allergies or changes in medical history, any new surgeries or procedures? NO    If yes, update Summary List   Subjective Functional Status/Changes:  []  No changes reported     See PN      OBJECTIVE  Modalities Rationale:     decrease inflammation and decrease pain to improve patient's ability to perform ADLs without pain     min [] Estim, type/location:                                      []  att     []  unatt     []  w/US     []  w/ice    []  w/heat    min []  Mechanical Traction: type/lbs                   []  pro   []  sup   []  int   []  cont    []  before manual    []  after manual    min []  Ultrasound, settings/location:      min []  Iontophoresis w/ dexamethasone, location:                                               []  take home patch       []  in clinic   10 min [x]  Ice     []  Heat    location/position: R shoulder    min []  Vasopneumatic Device, press/temp:     min []  Other:    [] Skin assessment post-treatment (if applicable):    []  intact    []  redness- no adverse reaction     []redness  adverse reaction:        45 min Therapeutic Exercise:  [x]  See flow sheet   Rationale:      increase ROM and increase strength to improve the patients ability to perform ADLs without pain       10 min Manual Therapy: PROM to R shoulder.  Trigger point release to pec minor/major and posterior cuff    Rationale:      increase ROM and increase tissue extensibility to improve patient's ability to perform ADLs without pain  The manual therapy interventions were performed at a separate and distinct time from the therapeutic activities interventions    Billed With/As:   [x] TE   [] TA   [] Neuro   [] Self Care Patient Education: [x] Review HEP    [] Progressed/Changed HEP based on:   [] positioning   [] body mechanics   [] transfers   [] heat/ice application    [] other:        Other Objective/Functional Measures:    See PN   Post Treatment Pain Level (on 0 to 10) scale:   0  / 10     ASSESSMENT  Assessment/Changes in Function:     See PN     []  See Progress Note/Recertification   Patient will continue to benefit from skilled PT services to modify and progress therapeutic interventions, address functional mobility deficits, address ROM deficits, address strength deficits and analyze and address soft tissue restrictions to attain remaining goals. Progress toward goals / Updated goals:    See PN     PLAN  [x]  Upgrade activities as tolerated YES Continue plan of care   []  Discharge due to :    []  Other:      Therapist: Tirso Pal DPT     Date: 11/24/2020 Time: 8:02 AM        No future appointments.

## 2020-11-24 NOTE — PROGRESS NOTES
[address] [facility] PHYSICAL THERAPY AT [address] [facility], [address] [place] [address], [address], [address] [place] [address]  Phone: [phone]  Fax: [phone]  PROGRESS NOTE  Patient Name: Omid Worthy : 1965   Treatment/Medical Diagnosis: Right shoulder pain [M25.511]   Referral Source: Hiro Durand MD     Date of Initial Visit: 2020 Attended Visits: 18 Missed Visits: -     SUMMARY OF TREATMENT  PT consisted of manual therapy techniques, therapeutic exercises, and modalities to improve strength, improve mobility, decrease pain, and improve overall function. CURRENT STATUS  Pt is s/p R large rotator cuff repair and IGHL repair on 2020. Pt reports she is about 70% overall improved since onset of care. Pt AROM flexion 155deg. Pt still demonstrating increased upper trap activation with overhead reaching. Pt reports she feels like she recently turned the corner and ADLs have become much easier    Goal/Measure of Progress Goal Met? 1. All ADLs without R shoulder pain > 2/10    Status at last Eval: 4 Current Status: 0-2 no   2. Increase FOTO score to > 60, to indicate increased function    Status at last Eval: 53 Current Status: 63 yes   3. Increase R shoulder AAROM flexion to > 140 degrees to allow overhead ADLs   Status at last Eval: <140 Current Status: AROM >140 yes   4. Pain free submax isometrics for all planes   Status at last Eval: no Current Status: painfree yes     New Goals to be achieved in __4__  weeks:  1. Continue with unmet goals above   2.  -   3.  -   4.  -       RECOMMENDATIONS  Pt to continue PT 1-2x a week for 4 weeks to continue to progress through protocol and improve pts abilit to perform ADLs  If you have any questions/comments please contact us directly at [phone]. Thank you for allowing us to assist in the care of your patient.     Therapist Signature: Flaca Rosario Date: 2020     Time: 2:18 PM   NOTE TO PHYSICIAN:  PLEASE COMPLETE THE ORDERS BELOW AND FAX TO   [facility] [place]: [phone]. If you are unable to process this request in 24 hours please contact our office: [phone].    ___ I have read the above report and request that my patient continue as recommended.   ___ I have read the above report and request that my patient continue therapy with the following changes/special instructions:_________________________________________________________   ___ I have read the above report and request that my patient be discharged from therapy.      Physician Signature:        Date:       Time:

## 2020-12-01 ENCOUNTER — HOSPITAL ENCOUNTER (OUTPATIENT)
Dept: PHYSICAL THERAPY | Age: 55
Discharge: HOME | End: 2020-12-01
Payer: COMMERCIAL

## 2020-12-01 PROCEDURE — 97110 THERAPEUTIC EXERCISES: CPT

## 2020-12-01 PROCEDURE — 97140 MANUAL THERAPY 1/> REGIONS: CPT

## 2020-12-01 NOTE — PROGRESS NOTES
PHYSICAL THERAPY - DAILY TREATMENT NOTE    Patient Name: Brandan Calvert        Date: 2020  : 1965    Patient  Verified: YES  Visit #:     Insurance: Payor: Ramonita Goodson / Plan: Stefano OLVERA / Product Type: Commerical /      In time: 8:54  Out time: 9:46 am   Total Treatment Time: 52     Medicare Time Tracking (below)   Total Timed Codes (min):  - 1:1 Treatment Time:  -     TREATMENT AREA/ DIAGNOSIS = Right shoulder pain [M25.511]    SUBJECTIVE  Pain Level (on 0 to 10 scale):  0  / 10   Medication Changes/New allergies or changes in medical history, any new surgeries or procedures? NO    If yes, update Summary List   Subjective Functional Status/Changes:  []  No changes reported     Pt reports she felt like she turned a corner about 2 weeks ago. I'm still very careful. OBJECTIVE  Modalities Rationale:     decrease inflammation and decrease pain to improve patient's ability to perform ADLs without pain     min [] Estim, type/location:                                      []  att     []  unatt     []  w/US     []  w/ice    []  w/heat    min []  Mechanical Traction: type/lbs                   []  pro   []  sup   []  int   []  cont    []  before manual    []  after manual    min []  Ultrasound, settings/location:      min []  Iontophoresis w/ dexamethasone, location:                                               []  take home patch       []  in clinic   10 min [x]  Ice     []  Heat    location/position: R shoulder    min []  Vasopneumatic Device, press/temp:     min []  Other:    [] Skin assessment post-treatment (if applicable):    []  intact    []  redness- no adverse reaction     []redness  adverse reaction:        32 min Therapeutic Exercise:  [x]  See flow sheet   Rationale:      increase ROM and increase strength to improve the patients ability to perform ADLs without pain       10 min Manual Therapy: PROM to R shoulder.  Trigger point release to pec minor/major and posterior cuff Rationale:      increase ROM and increase tissue extensibility to improve patient's ability to perform ADLs without pain  The manual therapy interventions were performed at a separate and distinct time from the therapeutic activities interventions    Billed With/As:   [x] TE   [] TA   [] Neuro   [] Self Care Patient Education: [x] Review HEP    [] Progressed/Changed HEP based on:   [] positioning   [] body mechanics   [] transfers   [] heat/ice application    [] other:        Other Objective/Functional Measures:  Exercise completed per flow sheet. Post Treatment Pain Level (on 0 to 10) scale:   0  / 10     ASSESSMENT  Assessment/Changes in Function:   Pt demonstrating improving scapular control in eccentric lowering from shoulder flexion and scaption with VC and visual feedback in mirror. Review RTC precautions and activity modification. Pt demonstrating slight fatigue in prone shoulder abduction and AROM shoulder flexion and scaption with short rest after 10 reps. Pt able to complete 20 reps without sx increase. Decreased reps in S/L ER to 2 sets 10 x secondary to mild fatigue. []  See Progress Note/Recertification   Patient will continue to benefit from skilled PT services to modify and progress therapeutic interventions, address functional mobility deficits, address ROM deficits, address strength deficits and analyze and address soft tissue restrictions to attain remaining goals.    Progress toward goals / Updated goals:  Continue toward all updated LTGs     PLAN  [x]  Upgrade activities as tolerated YES Continue plan of care   []  Discharge due to :    []  Other:      Therapist: Adrian Solares PTA ,DPT     Date: 12/1/2020 Time: 9:46  AM        Future Appointments   Date Time Provider Tucker Lara   12/4/2020  8:00 AM Juan M Hudson, PT [facility]   12/7/2020  8:00 AM Chai Jorgensen, PT [facility]

## 2020-12-04 ENCOUNTER — HOSPITAL ENCOUNTER (OUTPATIENT)
Dept: PHYSICAL THERAPY | Age: 55
Discharge: HOME | End: 2020-12-04
Payer: COMMERCIAL

## 2020-12-04 PROCEDURE — 97140 MANUAL THERAPY 1/> REGIONS: CPT

## 2020-12-04 PROCEDURE — 97110 THERAPEUTIC EXERCISES: CPT

## 2020-12-04 NOTE — PROGRESS NOTES
PHYSICAL THERAPY - DAILY TREATMENT NOTE    Patient Name: Robbi Fang        Date: 2020  : 1965    Patient  Verified: YES  Visit #:     Insurance: Payor: Cameron Ly / Plan: Mario Lamb RPN / Product Type: Commerical /      In time: 8 Out time: 8:50   Total Treatment Time: 50     Medicare/BCBS Time Tracking (below)   Total Timed Codes (min):  40 1:1 Treatment Time:  40     TREATMENT AREA/ DIAGNOSIS = Right shoulder pain [M25.511]    SUBJECTIVE  Pain Level (on 0 to 10 scale):  0  / 10   Medication Changes/New allergies or changes in medical history, any new surgeries or procedures?     NO    If yes, update Summary List   Subjective Functional Status/Changes:  []  No changes reported     Functional improvement no pain at rest   Functional limitation painful arc while elevating        OBJECTIVE  Modalities Rationale:     decrease edema, decrease inflammation and decrease pain to improve patient's ability to perform ADLs without pain   min [] Estim, type/location:                                      []  att     []  unatt     []  w/US     []  w/ice    []  w/heat    min []  Mechanical Traction: type/lbs                   []  pro   []  sup   []  int   []  cont    []  before manual    []  after manual    min []  Ultrasound, settings/location:      min []  Iontophoresis w/ dexamethasone, location:                                               []  take home patch       []  in clinic   10 min [x]  Ice     []  Heat    location/position:     min []  Vasopneumatic Device, press/temp:     min []  Other:    [x] Skin assessment post-treatment (if applicable):    [x]  intact    []  redness- no adverse reaction     []redness  adverse reaction:        8 min Manual Therapy: STM to shoulder and upper arm, PROM and rythmic stabs, GD IV inf/post GH mobs   Rationale:      decrease pain, increase ROM and increase tissue extensibility to improve patient's ability to perform ADLs without pain  The manual therapy interventions were performed at a separate and distinct time from the therapeutic activities interventions. 32 min Therapeutic Exercise:  [x]  See flow sheet   Rationale:      increase ROM and increase strength to improve the patients ability to perform ADLs without pain     Billed With/As:   [x] TE   [] TA   [] Neuro   [] Self Care Patient Education: [x] Review HEP    [] Progressed/Changed HEP based on:   [] positioning   [] body mechanics   [] transfers   [] heat/ice application    [] other:        Other Objective/Functional Measures:    Pt with FN LRF  Good MRE, to lower T/S  Needs VCS for scapular neutral, tends to round shoulders  > 90 degrees of passiv eER and > 70 degrees of passive IR   Post Treatment Pain Level (on 0 to 10) scale:   0  / 10     ASSESSMENT  Assessment/Changes in Function:     Low pain levels  Needs VCs for proper scapular retraction with therex  Limited ABD   []  See Progress Note/Recertification   Patient will continue to benefit from skilled PT services to modify and progress therapeutic interventions, address functional mobility deficits, address ROM deficits, address strength deficits, analyze and address soft tissue restrictions, analyze and cue movement patterns, analyze and modify body mechanics/ergonomics and assess and modify postural abnormalities to attain remaining goals.    Progress toward goals / Updated goals:    Low pain levels  Very good PROM     PLAN  [x]  Upgrade activities as tolerated YES Continue plan of care   []  Discharge due to :    []  Other:      Therapist: Hiral Dorsey PT    Date: 12/4/2020 Time: 8:35 AM     Future Appointments   Date Time Provider Tucker Lara   12/7/2020  8:00 AM Bela Brizuela PT [facility]

## 2020-12-07 ENCOUNTER — HOSPITAL ENCOUNTER (OUTPATIENT)
Dept: PHYSICAL THERAPY | Age: 55
Discharge: HOME | End: 2020-12-07
Payer: COMMERCIAL

## 2020-12-07 PROCEDURE — 97140 MANUAL THERAPY 1/> REGIONS: CPT

## 2020-12-07 PROCEDURE — 97110 THERAPEUTIC EXERCISES: CPT

## 2020-12-07 NOTE — PROGRESS NOTES
PHYSICAL THERAPY - DAILY TREATMENT NOTE     Patient Name: Sebastián Quiroz        Date: 2020  : 1965   YES Patient  Verified  Visit #:     Insurance: Payor: Twin Renner / Plan: Geronimo Ramirez RPN / Product Type: Commerical /      In time: 205 Out time: 850   Total Treatment Time: 55     Medicare/[facility] Time Tracking (below)   Total Timed Codes (min):   1:1 Treatment Time:       TREATMENT AREA =  Right shoulder pain [M25.511]    SUBJECTIVE    Pain Level (on 0 to 10 scale):  3  / 10 (5/10 at worst)   Medication Changes/New allergies or changes in medical history, any new surgeries or procedures? NO    If yes, update Summary List   Subjective Functional Status/Changes:  []  No changes reported   Sore from work out- 2 sessions in a week.     Overall- 80-85% improved           OBJECTIVE  Modalities Rationale:     decrease inflammation and decrease pain to improve patient's ability to reach wit less pain      min [] Estim, type/location:                                      []  att     []  unatt     []  w/US     []  w/ice    []  w/heat    min []  Mechanical Traction: type/lbs                   []  pro   []  sup   []  int   []  cont    []  before manual    []  after manual    min []  Ultrasound, settings/location:      min []  Iontophoresis w/ dexamethasone, location:                                               []  take home patch       []  in clinic   10 min [x]  Ice     []  Heat    location/position:     min []  Vasopneumatic Device, press/temp:     min []  Other:    [x] Skin assessment post-treatment (if applicable):    [x]  intact    []  redness- no adverse reaction     []redness  adverse reaction:      10 min Manual Therapy: Technique:      [x] S/DTM []IASTM []PROM [] Passive Stretching   [x]manual TPR  [] SOR [] man traction  []Jt manipulation:Gr I [] II []  III [] IV[]   [] OP with REIL    []   Treatment Area: R shoulder/ scap/ pec       *manual therapy interventions were performed at a separate and distinct time from   the therapeutic activities interventions. Rationale:      decrease pain, increase ROM, increase tissue extensibility and decrease trigger points to improve patient's ability to return to PLOF    35 min Therapeutic Exercise:  [x]  See flow sheet   Rationale:      increase ROM and increase strength to improve the patients ability to return to PLOF       Billed With/As:   [] TE   [] TA   [] Neuro   [] Self Care Patient Education: [x] Review HEP    [] Progressed/Changed HEP based on:   [] positioning   [] body mechanics   [] transfers   [] heat/ice application    [] other:        Other Objective/Functional Measures:    R shoulder AROM: flex= 150, scap= 145, ER= 86  ( L shoulder: flex= 165, scap= 168, ER= 90)    R shoulder strength= flex= 3+/5, scap= 4-/5, ER= 4-/ 5   Post Treatment Pain Level (on 0 to 10) scale:   0  / 10     ASSESSMENT  Assessment/Changes in Function:      Functional improvement:  minimal problem with low level reaching, able to reach overhead but not with weight  Functional limitation:  Pain with reaching, weakness with reaching, difficulty lifting > 2 lbs to overhead with R UE, has not returned to gym/ yoga--unable to bear weight through the arm           Patient will continue to benefit from skilled PT services to modify and progress therapeutic interventions, address functional mobility deficits, address ROM deficits, address strength deficits and analyze and address soft tissue restrictions to attain remaining goals.    Progress toward goals / Updated goals:    FAIR  Progress to    [] STG    [] LTG  1 as shown by NPS         []  See Progress Note/Recertification    PLAN    [x]  Upgrade activities as tolerated {YES) Continue plan of care   []  Discharge due to :    []  Other:      Therapist: Lynette Kidd PT    Date: 12/7/2020 Time: 7:58 AM     Future Appointments   Date Time Provider Tucker Lara   12/7/2020  8:00 AM Dayna Quiros PT [facility]

## 2020-12-17 ENCOUNTER — HOSPITAL ENCOUNTER (OUTPATIENT)
Dept: PHYSICAL THERAPY | Age: 55
Discharge: HOME | End: 2020-12-17
Payer: COMMERCIAL

## 2020-12-17 PROCEDURE — 97110 THERAPEUTIC EXERCISES: CPT

## 2020-12-17 PROCEDURE — 97140 MANUAL THERAPY 1/> REGIONS: CPT

## 2020-12-17 NOTE — PROGRESS NOTES
PHYSICAL THERAPY - DAILY TREATMENT NOTE    Patient Name: Diana Alonso        Date: 2020  : 1965    Patient  Verified: YES  Visit #:     Insurance: Payor: Jayme Both / Plan: Osmany Colon RPN / Product Type: Commerical /      In time: 6:55 Out time: 8:00   Total Treatment Time: 65     Medicare Time Tracking (below)   Total Timed Codes (min):  - 1:1 Treatment Time:  -     TREATMENT AREA/ DIAGNOSIS = Right shoulder pain [M25.511]    SUBJECTIVE  Pain Level (on 0 to 10 scale):  0  / 10   Medication Changes/New allergies or changes in medical history, any new surgeries or procedures? NO    If yes, update Summary List   Subjective Functional Status/Changes:  []  No changes reported     Pt reports she is feeling much better. Pt able to reach overhead.  Still some difficulty lifting objects      OBJECTIVE  Modalities Rationale:     decrease inflammation and decrease pain to improve patient's ability to perform ADLs without pain     min [] Estim, type/location:                                      []  att     []  unatt     []  w/US     []  w/ice    []  w/heat    min []  Mechanical Traction: type/lbs                   []  pro   []  sup   []  int   []  cont    []  before manual    []  after manual    min []  Ultrasound, settings/location:      min []  Iontophoresis w/ dexamethasone, location:                                               []  take home patch       []  in clinic   10 min [x]  Ice     []  Heat    location/position: R shoulder    min []  Vasopneumatic Device, press/temp:     min []  Other:    [] Skin assessment post-treatment (if applicable):    []  intact    []  redness- no adverse reaction     []redness  adverse reaction:        45 min Therapeutic Exercise:  [x]  See flow sheet   Rationale:      increase ROM and increase strength to improve the patients ability to perform ADLs without pain       10 min Manual Therapy: PROM to R shoulder   Rationale:      decrease pain, increase ROM and increase tissue extensibility to improve patient's ability to perform ADLs without pain  The manual therapy interventions were performed at a separate and distinct time from the therapeutic activities interventions      Billed With/As:   [x] TE   [] TA   [] Neuro   [] Self Care Patient Education: [x] Review HEP    [] Progressed/Changed HEP based on:   [] positioning   [] body mechanics   [] transfers   [] heat/ice application    [] other:        Other Objective/Functional Measures:    PROM WFL   Post Treatment Pain Level (on 0 to 10) scale:   0  / 10     ASSESSMENT  Assessment/Changes in Function:     Pt showing good scapulohumeral rhythm. Tried to perform D1/D2 but pt has some discomfort so withheld them     []  See Progress Note/Recertification   Patient will continue to benefit from skilled PT services to modify and progress therapeutic interventions, address functional mobility deficits, address ROM deficits, address strength deficits and analyze and address soft tissue restrictions to attain remaining goals.    Progress toward goals / Updated goals:    Good Progress to    [] STG    [x] LTG  1 as shown by improved overall ability to perform ADLs     PLAN  [x]  Upgrade activities as tolerated YES Continue plan of care   []  Discharge due to :    []  Other:      Therapist: Lisa Dodd DPT     Date: 12/17/2020 Time: 7:37 AM        Future Appointments   Date Time Provider Tucker Lara   12/22/2020  3:45 PM Jonny Kellogg [facility]   12/29/2020  7:45 AM Stephenie Shirts, PT [facility]   12/31/2020  7:45 AM Stephenie Shirts, PT [facility]   1/4/2021  8:15 AM George Joyce PTA [facility]   1/7/2021  7:45 AM Stephenie Shirts, PT [facility]

## 2020-12-22 ENCOUNTER — HOSPITAL ENCOUNTER (OUTPATIENT)
Dept: PHYSICAL THERAPY | Age: 55
Discharge: HOME | End: 2020-12-22
Payer: COMMERCIAL

## 2020-12-22 PROCEDURE — 97110 THERAPEUTIC EXERCISES: CPT

## 2020-12-22 PROCEDURE — 97140 MANUAL THERAPY 1/> REGIONS: CPT

## 2020-12-22 NOTE — PROGRESS NOTES
PHYSICAL THERAPY - DAILY TREATMENT NOTE    Patient Name: Jabier Asa        Date: 2020  : 1965    Patient  Verified: YES  Visit #:     Insurance: Payor: Matt Rose / Plan: Guido OLVERA / Product Type: Commerical /      In time: 3:45 Out time: 4:35   Total Treatment Time: 50     Medicare/BCBS Time Tracking (below)   Total Timed Codes (min):  40 1:1 Treatment Time:  40     TREATMENT AREA/ DIAGNOSIS = Right shoulder pain [M25.511]    SUBJECTIVE  Pain Level (on 0 to 10 scale):  0  / 10   Medication Changes/New allergies or changes in medical history, any new surgeries or procedures?     NO    If yes, update Summary List   Subjective Functional Status/Changes:  []  No changes reported     Functional improvement I can sleep on my R side now   Functional limitation it hurts with overhead activities        OBJECTIVE  Modalities Rationale:     decrease edema, decrease inflammation and decrease pain to improve patient's ability to perform ADLs without pain   min [] Estim, type/location:                                      []  att     []  unatt     []  w/US     []  w/ice    []  w/heat    min []  Mechanical Traction: type/lbs                   []  pro   []  sup   []  int   []  cont    []  before manual    []  after manual    min []  Ultrasound, settings/location:      min []  Iontophoresis w/ dexamethasone, location:                                               []  take home patch       []  in clinic   10 min [x]  Ice     []  Heat    location/position:     min []  Vasopneumatic Device, press/temp:     min []  Other:    [x] Skin assessment post-treatment (if applicable):    [x]  intact    []  redness- no adverse reaction     []redness  adverse reaction:        8 min Manual Therapy: GD III inf/post GH mobs and PROM, rythmic stabs   Rationale:      decrease pain, increase ROM and increase tissue extensibility to improve patient's ability to perform ADLs without pain  The manual therapy interventions were performed at a separate and distinct time from the therapeutic activities interventions. 32 min Therapeutic Exercise:  [x]  See flow sheet   Rationale:      increase ROM and increase strength to improve the patients ability to perform ADLs without pain     Billed With/As:   [x] TE   [] TA   [] Neuro   [] Self Care Patient Education: [x] Review HEP    [] Progressed/Changed HEP based on:   [] positioning   [] body mechanics   [] transfers   [] heat/ice application    [] other:        Other Objective/Functional Measures:    See progress note   Post Treatment Pain Level (on 0 to 10) scale:   0  / 10     ASSESSMENT  Assessment/Changes in Function:     See PN     []  See Progress Note/Recertification   Patient will continue to benefit from skilled PT services to modify and progress therapeutic interventions, address functional mobility deficits, address ROM deficits, address strength deficits, analyze and address soft tissue restrictions, analyze and cue movement patterns, analyze and modify body mechanics/ergonomics and assess and modify postural abnormalities to attain remaining goals.    Progress toward goals / Updated goals:    See PN     PLAN  [x]  Upgrade activities as tolerated YES Continue plan of care   []  Discharge due to :    []  Other:      Therapist: Kimmy Puga PT    Date: 12/22/2020 Time: 4:22 PM     Future Appointments   Date Time Provider Tucker Lara   12/29/2020  7:45 AM Juan M Hudson PT [facility]   12/31/2020  7:45 AM Juan M Hudson PT [facility]   1/4/2021  8:15 AM Franko Lopez PTA [facility]   1/7/2021  7:45 AM Juan M Hudson PT [facility]

## 2020-12-22 NOTE — PROGRESS NOTES
[facility] PHYSICAL THERAPY AT [address] [facility], [address] [place] [address], [address], [address] [place] [address]  Phone: [phone]  Fax: [phone]  PROGRESS NOTE  Patient Name: Karolina Small : 1965   Treatment/Medical Diagnosis: Right shoulder pain [M25.511]   Referral Source: Rene Leon MD     Date of Initial Visit: 2020 Attended Visits: 18 Missed Visits: -     SUMMARY OF TREATMENT  PT consisted of manual therapy techniques, therapeutic exercises, and modalities to improve strength, improve mobility, decrease pain, and improve overall function. CURRENT STATUS  Pt is s/p R large rotator cuff repair and IGHL repair on 2020. Pt reports she has been pain nikolai and can now sleep on her R side. Her reach behind her back is limited to T10 (T5 L). Her flexion is limited to 158 degrees, but full AROM otherwise. FOTO score is now 61, was 59 (36 at eval). Goal/Measure of Progress Goal Met? 1.     Status at last Eval:  Current Status:  yes   2. Status at last Eval:  Current Status:  yes   3. Status at last Eval:  Current Status:  yes   4. Status at last Eval:  Current Status:  yes     New Goals to be achieved in __4__  weeks:  1. Functional (to T7 or >) pain free reach behind back (MRE)   2. All ADLs without pain   3. Pt able to perform 10 push ups from her knees without pain   4. Pt to report GROC of >=+5, to indicate increased function       RECOMMENDATIONS  Pt to continue PT 1-2x a week for 4 weeks to continue to progress through protocol and improve pts abilit to perform ADLs  If you have any questions/comments please contact us directly at [phone]. Thank you for allowing us to assist in the care of your patient. Therapist Signature: Sinan Crane, PT Date: 2020     Time: 2:18 PM   NOTE TO PHYSICIAN:  PLEASE COMPLETE THE ORDERS BELOW AND FAX TO   [facility] at [address]: [phone].   If you are unable to process this request in 24 hours please contact our office: [phone].    ___ I have read the above report and request that my patient continue as recommended.   ___ I have read the above report and request that my patient continue therapy with the following changes/special instructions:_________________________________________________________   ___ I have read the above report and request that my patient be discharged from therapy.      Physician Signature:        Date:       Time:

## 2020-12-29 ENCOUNTER — HOSPITAL ENCOUNTER (OUTPATIENT)
Dept: PHYSICAL THERAPY | Age: 55
Discharge: HOME | End: 2020-12-29
Payer: COMMERCIAL

## 2020-12-29 PROCEDURE — 97140 MANUAL THERAPY 1/> REGIONS: CPT

## 2020-12-29 PROCEDURE — 97110 THERAPEUTIC EXERCISES: CPT

## 2020-12-29 NOTE — PROGRESS NOTES
Inpatient Behavioral Health Initial Evaluation    Patient:  Dionne Nance 72 year old  MRN#:  [id number]  Date of Service:  8/6/2020  Primary Provider:  Saeid Swanson MD  Tele-psychiatry consult performed from Home  Location of the patient:  [facility]     Chief Complaint:  Acute on chronic diastolic CHF exacerbation     Informants:  The patient, who is considered a fair historian, as well as the patient's medical record.    History of Present Illness:  Ms. Dionne Nance is a 73 yo female with a PMHx of HTN, CAD, T2DM,  Previous covid PNA, nephrolithiasis, hydronephrosis and chronic pain who is presenting in the setting of acute on chronic diastolic heart failure. She arrived in the emergency departmenton 7/28 with a cough and shortness of breath. Today she is struggling with significant fatigue and severe nausea and vomiting.     Psychiatry was consulted for depression and anxiety. The patient has previously been diagnosed with depression and anxiety and has been treated in the outpatient setting by her primary care provider for this. Aside from depression and anxiety, she has had inpatient psychiatric consults for altered mental status in the setting of medication toxicity (2016) and unintentional narcotic overdose (2013). This does not seem to be a contributing factor to her current condition.     Regarding her depression and anxiety, she states that she has benefitted from amitriptyline therapy, but has not found sertraline to be as effective. She states that this year her health has gone \"down the tubes.\"Today she is very sleepy and has a difficult time answering questions at length. She states that she has not slept much during her hospital course d/t uncontrolled pain and n/v and is requesting changes to her medication to help her sleep.  She says that the current trazadone dosage  has not helped her at all. When asked about her mood, she is quick to say that \"I am not feeling despondent,\" it is mostly her nausea and  PHYSICAL THERAPY - DAILY TREATMENT NOTE    Patient Name: Lake Hale        Date: 2020  : 1965    Patient  Verified: YES  Visit #:     Insurance: Payor: Cornelia Mccoy / Plan: [id number] RPN / Product Type: Commerical /      In time: 7:45 Out time: 8:30   Total Treatment Time: 45     Medicare/BCBS Time Tracking (below)   Total Timed Codes (min):  35 1:1 Treatment Time:  35     TREATMENT AREA/ DIAGNOSIS = Right shoulder pain [M25.511]    SUBJECTIVE  Pain Level (on 0 to 10 scale):  2  / 10   Medication Changes/New allergies or changes in medical history, any new surgeries or procedures?     NO    If yes, update Summary List   Subjective Functional Status/Changes:  []  No changes reported     Functional improvement I can reach higher now   Functional limitation   Lakisha been sore since the last visit       OBJECTIVE  Modalities Rationale:     decrease edema, decrease inflammation and decrease pain to improve patient's ability to perform ADLs without pain   min [] Estim, type/location:                                      []  att     []  unatt     []  w/US     []  w/ice    []  w/heat    min []  Mechanical Traction: type/lbs                   []  pro   []  sup   []  int   []  cont    []  before manual    []  after manual    min []  Ultrasound, settings/location:      min []  Iontophoresis w/ dexamethasone, location:                                               []  take home patch       []  in clinic   10 min [x]  Ice     []  Heat    location/position:     min []  Vasopneumatic Device, press/temp:     min []  Other:    [x] Skin assessment post-treatment (if applicable):    [x]  intact    []  redness- no adverse reaction     []redness  adverse reaction:        8 min Manual Therapy: STM to R upper arm, GD III inf/post GH mobs, aPROM   Rationale:      decrease pain, increase ROM and increase tissue extensibility to improve patient's ability to perform ADLs without pain  The manual therapy interventions lack of sleep that is bothering her. She still feels hopeful that her condition will improve. She is willing to speak more in depth about her mood upon follow-up visits.    Psychiatric Review of Symptoms:  Regarding symptoms of DEPRESSION, the patient denies depressed mood, she is unable to sleep because of pain and n/v. She denies anhedonia, feelings of guilt, low energy, poor concentration, change in appetite, psychomotor slowing and suicidal ideation.    When asked about symptoms of GENERALIZED ANXIETY, the patient denies increased worry, muscle tension, restlessness, poor concentration, and irritability.      VITALS:  Visit Vitals  BP (!) 200/85 (BP Location: LUE - Left upper extremity, Patient Position: Supine)   Pulse 99   Temp 98.6 °F (37 °C) (Axillary)   Resp 18   Ht 5' 3\" (1.6 m)   Wt 84.9 kg   SpO2 92%   BMI 33.16 kg/m²         Past Psychiatric History:  Previous Diagnoses:  Depression, anxiety  Hospitalizations:  none  Suicide Attempts/Self-Harm Behaviors:  Denies  Outpatient MH Providers:  None. PCP prescribes medication  Medication Trials:  Amitriptyline, Zoloft, Buspar, Diazepam, Ambien  ECT:  none    Past Medical History:  Past Medical History:   Diagnosis Date   • Acute canaliculitis of right lacrimal passage 9/1/16    Dr Jurado   • Anemia    • Anxiety    • Arthritis     lumbar, knees, shoulders   • Chronic back pain    • Family history of malignant hyperthermia     Son - age 4 surgery - death   • GERD    • HTN (hypertension)    • Hx of complete eye exam [id number]    no diabetic retinopathy   • Hyperlipidemia    • Insomnia    • Major depression    • MRSA nasal colonization 7/17/2016   • Multinodular goiter    • Nephrolithiasis    • Onychomycosis     x 10, w/onychocryptosis bilateral hallux   • Osteoporosis    • Paget's disease    • Peripheral neuropathy    • Pes planus     with pronation syndrome   • Pneumonia 4/11/2020   • Shingles    • Sinusitis, chronic    • Sleep apnea    • Spinal headache    •  were performed at a separate and distinct time from the therapeutic activities interventions. 27 min Therapeutic Exercise:  [x]  See flow sheet   Rationale:      increase ROM and increase strength to improve the patients ability to perform ADLs without pain     Billed With/As:   [x] TE   [] TA   [] Neuro   [] Self Care Patient Education: [x] Review HEP    [] Progressed/Changed HEP based on:   [] positioning   [] body mechanics   [] transfers   [] heat/ice application    [] other:        Other Objective/Functional Measures:    Pt with soreness in her R AC joint after last visit  Made sure she limited range in her active scap and flex and S/L abd     Post Treatment Pain Level (on 0 to 10) scale:   0  / 10     ASSESSMENT  Assessment/Changes in Function:     Full PROM  Increased soreness in her R AC joint after last visit   []  See Progress Note/Recertification   Patient will continue to benefit from skilled PT services to modify and progress therapeutic interventions, address functional mobility deficits, address ROM deficits, address strength deficits, analyze and address soft tissue restrictions, analyze and cue movement patterns and analyze and modify body mechanics/ergonomics to attain remaining goals.    Progress toward goals / Updated goals:    Low pain levels and full PROM     PLAN  [x]  Upgrade activities as tolerated YES Continue plan of care   []  Discharge due to :    []  Other:      Therapist: Leonroa Atkinson PT    Date: 12/29/2020 Time: 8:15 AM     Future Appointments   Date Time Provider Tucker Lara   12/31/2020  7:45 AM Krista Julian PT [facility]   1/4/2021  8:15 AM Reese Choudhury PTA [facility]   1/7/2021  7:45 AM Krista Julian, PT [facility] Type 2 diabetes mellitus (CMS/HCC)    • Urinary incontinence    • Vertigo    • Vitamin B12 deficiency    • Vitamin D deficiency    • Wears glasses        Past Surgical History:  Past Surgical History:   Procedure Laterality Date   • Appendectomy     • Back surgery      multiple   • Bd dexa axial skeleton  04/16/2012    osteoporosis   • Breast surgery      benign tumors in R breast removed   • Cervical fusion  5/2/2013    C5-6, C6-7 decompression and fusion   • Closed rx dist fibula fx      right   • Esophagogastroduodenoscopy transoral flex w/bx single or mult  06/30/2009    EGD with Bx   • External fixation foot fracture  02/01/2009    left   • Eye surgery Bilateral     Cataract surgery   • Eye surgery  09/02/2016    right eye lacrimal repair   • Gynecologic cryosurgery     • Hysterectomy     • Knee scope,diagnostic      Knee Arthroscopy-right   • Kyphoplasty  2/9/16    Dr Louise   • Sinus surgery     • Tarsal tunnel release     • Total shoulder arthroplasty Right 4/21/15    Dr. Metzger   • Us guided thyroid biopsy  11/13/2013       Allergies:  ALLERGIES:   Allergen Reactions   • Hydrochlorothiazide RASH       Medications:  Current Facility-Administered Medications   Medication Dose Route Frequency Provider Last Rate Last Dose   • prochlorperazine (COMPAZINE) tablet 5 mg  5 mg Oral Q4H PRN Dariel Maynard MD   5 mg at 08/06/20 1044   • cloNIDine (CATAPRES) tablet 0.2 mg  0.2 mg Oral TID Saeid Swanson MD       • carvedilol (COREG) tablet 25 mg  25 mg Oral 2 times per day Shanelle Roque CNP   25 mg at 08/06/20 1251   • pregabalin (LYRICA) capsule 25 mg  25 mg Oral 2 times per day Saeid Swanson MD   25 mg at 08/06/20 1256   • insulin glargine (LANTUS) injection 24 Units  24 Units Subcutaneous Nightly Saeid Swanson MD   24 Units at 08/05/20 2213   • traZODone (DESYREL) tablet 50 mg  50 mg Oral Nightly PRN Saeid Swanson MD   50 mg at 08/03/20 2059   • cyclobenzaprine (FLEXERIL) tablet 5 mg  5 mg Oral TID PRN Saeid Swanson MD        • metoCLOPramide (REGLAN) injection 5 mg  5 mg Intravenous Q6H PRN Aisha Crawley NP   5 mg at 08/04/20 0131   • docusate sodium-sennosides (SENOKOT S) 50-8.6 MG 2 tablet  2 tablet Oral BID Aisha Crawley NP   2 tablet at 08/06/20 1252   • lidocaine (LIDOCARE) 4 % patch 1 patch  1 patch Transdermal Daily Aisha Crawley NP   1 patch at 08/06/20 0930   • hydrALAZINE (APRESOLINE) injection 10 mg  10 mg Intravenous Q6H PRN Dariel Maynard MD   10 mg at 08/05/20 0439   • sodium chloride 0.9 % flush bag 25 mL  25 mL Intravenous PRN Dariel Maynard MD       • sodium chloride (PF) 0.9 % injection 2 mL  2 mL Intracatheter 2 times per day Dariel Maynard MD   2 mL at 08/06/20 1150   • hydrALAZINE (APRESOLINE) tablet 100 mg  100 mg Oral TID Dariel Maynard MD   100 mg at 08/06/20 1252   • insulin lispro (HumaLOG) injection 8 Units  8 Units Subcutaneous TID AC Dariel Maynard MD   8 Units at 08/05/20 1815   • insulin glargine (LANTUS) injection 30 Units  30 Units Subcutaneous Daily Dariel Maynard MD   30 Units at 08/06/20 1051   • levETIRAcetam (KepPRA) tablet 500 mg  500 mg Oral 2 times per day Dariel Maynard MD   500 mg at 08/06/20 1251   • oxyCODONE (IMM REL) (ROXICODONE) tablet 5 mg  5 mg Oral Q6H PRN Dariel Maynard MD   5 mg at 08/06/20 1049   • pantoprazole (PROTONIX) EC tablet 40 mg  40 mg Oral 2 times per day Dariel Maynard MD   40 mg at 08/06/20 1251   • sodium bicarbonate tablet 650 mg  650 mg Oral TID Dariel Maynard MD   650 mg at 08/06/20 1251   • sodium chloride 0.9 % flush bag 25 mL  25 mL Intravenous PRN Dariel Maynard MD       • enoxaparin (LOVENOX) injection 30 mg  30 mg Subcutaneous Nightly Dariel Maynard MD   30 mg at 08/05/20 2214   • ondansetron (ZOFRAN) injection 4 mg  4 mg Intravenous BID PRN Dariel Maynard MD   4 mg at 08/06/20 1210   • polyethylene glycol (MIRALAX) packet 17 g  17 g Oral Daily PRN Dariel Maynard MD   17 g at 08/02/20 0847   • bisacodyl (DULCOLAX) suppository 10 mg   10 mg Rectal Daily PRN Dariel Maynard MD       • dextrose 50 % injection 25 g  25 g Intravenous PRN Dariel Maynard MD       • dextrose 50 % injection 12.5 g  12.5 g Intravenous PRN Dariel Maynard MD       • dextrose 5 % infusion   Intravenous Continuous PRN Dareil Maynard MD       • glucagon (GLUCAGEN) injection 1 mg  1 mg Intramuscular PRN Dariel Maynard MD       • dextrose (GLUTOSE) 40 % gel 15 g  15 g Oral PRN Dariel Maynard MD       • dextrose (GLUTOSE) 40 % gel 30 g  30 g Oral PRN Dariel Maynard MD       • acetaminophen (TYLENOL) tablet 650 mg  650 mg Oral Q6H PRN Dariel Maynard MD   650 mg at 08/01/20 0513       Social History:   [place]  [facility] [place]  History of Abuse:  negative  Relationships:  . 4 children  Financial Status:  independent  Alcohol:  none  Tobacco:  Previous smoker but quit 20+ years ago  Drugs:  None    She is currently living in     History     Social History     Social History Narrative   • Not on file       Family Medical History:  family history includes * in her mother; Arthritis in her mother; Cancer in her brother and father; Connective Tissue Disorder in her son; Diabetes in her mother; Other in her son; Seizure Disorder in her son.    Mental Status Exam:  Orientation:  Alert and oriented to person, place and situation   Appearance:  Appropriately groomed and casually dressed   Speech:  Appropriate rate, rhythm, volume and inflection   Eye contact:  maintained  Behavior:  Cooperative   Psychomotor:  No agitation, tic, tremor, slowing or abnormal movement noted   Mood:  \" I do not feel despondent \"  Affect:  Appropriate.   Thought Process:  Linear, logical, goal oriented   Suicidality:  none  Thought Content:  no homicidal ideation, no auditory and visual hallucinations, not actively responding to internal stimuli   Insight:  fair  Judgment:  fair  Sensorium:  Grossly intact   Cognition:  Was not formally tested and found to be without appreciable deficit   Attention:   good  Concentration:  good    Biopsychosocial Assessment:  Dionne Nance 72 year old female who is admitted to the hospital with acute on chronic diastolic heart failure following nephrolithiasis, hydronephrosis, and covid pneumonia earlier this year. The pt feels tired and sleepy today and has been unable to sleep given her pain and N/V. Though her health has been compromised over the previous few months, she is still hopeful that her condition will improve. At this time she simply wants to get some rest and be able to sleep at night. She has a history of depression and anxiety, though today she is reporting her mood to be stable. She currently resides in the [facility] facility. She is open to discussing her mood further upon follow-up.    Diagnoses:    1. Insomnia  2 unspecified depression  3. Chronic pain disorder    Plan:  1. Initiate Amitriptyline 25 mg she reports being on the amitriptyline 50 mg in the past for years with benefit.  Amitriptyline also would help with chronic burning pain she is experiencing in her eyes.  2. Increase Trazodone to 100 mg   3. Will meet with patient for follow-up to discuss mood further    The patient case has been discussed and evaluated with Dr. Salima Babcock MS3    I have personally assessed the patient with Paty Babcock MS 3.  I reviewed the chart, discussed the patient with the staff and independently examined and evaluated the patient including mental status.  I have discussed and formulated the treatment plan and I have made necessary revisions as indicated to the above note.    If the patient needs a follow up, please call [phone]    Salima Ho MD, FAPA, FACLP  Dept of Psychiatry  [facility]  Ph : [phone]

## 2020-12-31 ENCOUNTER — HOSPITAL ENCOUNTER (OUTPATIENT)
Dept: PHYSICAL THERAPY | Age: 55
Discharge: HOME | End: 2020-12-31
Payer: COMMERCIAL

## 2020-12-31 PROCEDURE — 97110 THERAPEUTIC EXERCISES: CPT

## 2020-12-31 PROCEDURE — 97140 MANUAL THERAPY 1/> REGIONS: CPT

## 2020-12-31 NOTE — PROGRESS NOTES
PHYSICAL THERAPY - DAILY TREATMENT NOTE    Patient Name: Jordyn Abraham        Date: 2020  : 1965    Patient  Verified: YES  Visit #:     Insurance: Payor: Avani Blue / Plan: [place] OLVERA / Product Type: Commerical /      In time: 7:45 Out time: 8:25   Total Treatment Time: 50     Medicare/[employer] Time Tracking (below)   Total Timed Codes (min):  40 1:1 Treatment Time:  40     TREATMENT AREA/ DIAGNOSIS = Right shoulder pain [M25.511]    SUBJECTIVE  Pain Level (on 0 to 10 scale):  1  / 10   Medication Changes/New allergies or changes in medical history, any new surgeries or procedures? NO    If yes, update Summary List   Subjective Functional Status/Changes:  []  No changes reported     Functional improvement no more sore today   Functional limitation sore on the top of my R shoulder        OBJECTIVE      8 min Manual Therapy: STM to R shoulder, PROM to R shoulder, GD III inf/post GH mobs, rythmic stabs     Rationale:      decrease pain, increase ROM and increase tissue extensibility to improve patient's ability to perform ADLs without pain  The manual therapy interventions were performed at a separate and distinct time from the therapeutic activities interventions.     32 min Therapeutic Exercise:  [x]  See flow sheet   Rationale:      increase ROM and increase strength to improve the patients ability to perform ADLs without pain     Billed With/As:   [x] TE   [] TA   [] Neuro   [] Self Care Patient Education: [x] Review HEP    [] Progressed/Changed HEP based on:   [] positioning   [] body mechanics   [] transfers   [] heat/ice application    [] other:        Other Objective/Functional Measures:    Pt with full passive ER and IR  Added IR towel stretch to improve MRE  Pt declined ice  Added table push ups form high table   Post Treatment Pain Level (on 0 to 10) scale:   0  / 10     ASSESSMENT  Assessment/Changes in Function:     No pain with high table push ups  Only pain today was tender R AC joint   []  See Progress Note/Recertification   Patient will continue to benefit from skilled PT services to modify and progress therapeutic interventions, address functional mobility deficits, address ROM deficits, address strength deficits, analyze and address soft tissue restrictions, analyze and cue movement patterns and analyze and modify body mechanics/ergonomics to attain remaining goals.    Progress toward goals / Updated goals:    Low pain levels and full PROM      PLAN  [x]  Upgrade activities as tolerated YES Continue plan of care   []  Discharge due to :    []  Other:      Therapist: Carmen Gutierres, PT    Date: 12/31/2020 Time: 9:13 AM     Future Appointments   Date Time Provider Tucker Lara   1/4/2021  8:15 AM Niurka Arellano PTA [facility]   1/7/2021  7:45 AM Melisa Karimi PT [facility]

## 2021-01-04 ENCOUNTER — HOSPITAL ENCOUNTER (OUTPATIENT)
Dept: PHYSICAL THERAPY | Age: 56
Discharge: HOME | End: 2021-01-04
Payer: COMMERCIAL

## 2021-01-04 PROCEDURE — 97140 MANUAL THERAPY 1/> REGIONS: CPT

## 2021-01-04 PROCEDURE — 97110 THERAPEUTIC EXERCISES: CPT

## 2021-01-04 NOTE — PROGRESS NOTES
PHYSICAL THERAPY - DAILY TREATMENT NOTE    Patient Name: Larissa Montenegro        Date: 2021  : 1965    Patient  Verified: YES  Visit #:     Insurance: Payor: Glendy Major / Plan: Angeli OLVERA / Product Type: Commerical /      In time: 8:22  Out time: 9:10   Total Treatment Time: 49     Medicare/[facility] Time Tracking (below)   Total Timed Codes (min):   1:1 Treatment Time:       TREATMENT AREA/ DIAGNOSIS = Right shoulder pain [M25.511]    SUBJECTIVE  Pain Level (on 0 to 10 scale):  2  / 10   Medication Changes/New allergies or changes in medical history, any new surgeries or procedures? NO    If yes, update Summary List   Subjective Functional Status/Changes:  []  No changes reported     Functional improvement  ROM ~ 90%; strength: ~70%  Functional limitation aching in shoulder yesterday. She thinks it may be from the rain       OBJECTIVE  Modalities Rationale:     decrease edema, decrease inflammation and decrease pain to improve patient's ability to perform ADLs without pain                min []? Estim, type/location:                                                            []?  att     []?  unatt     []?  w/US     []?  w/ice    []?  w/heat     min []? Mechanical Traction: type/lbs                    []?  pro   []?  sup   []? int   []?  cont    []? before manual    []? after manual     min []? Ultrasound, settings/location:        min []? Iontophoresis w/ dexamethasone, location:                                                []?  take home patch       []? in clinic   10 min [x]? Ice     []? Heat    location/position:       min []? Vasopneumatic Device, press/temp:       min []? Other:     [x]? Skin assessment post-treatment (if applicable):    [x]? intact    []? redness- no adverse reaction     []? redness  adverse reaction:          8 min Manual Therapy: STM to R shoulder, PROM to R shoulder,  rythmic stabs in scapular plane    Rationale:      decrease pain, increase ROM and increase tissue extensibility to improve patient's ability to perform ADLs without pain  The manual therapy interventions were performed at a separate and distinct time from the therapeutic activities interventions. 30 min Therapeutic Exercise:  [x]  See flow sheet   Rationale:      increase ROM and increase strength to improve the patients ability to perform ADLs without pain     Billed With/As:   [x] TE   [] TA   [] Neuro   [] Self Care Patient Education: [x] Review HEP    [] Progressed/Changed HEP based on:   [] positioning   [] body mechanics   [] transfers   [] heat/ice application    [] other:        Other Objective/Functional Measures:    AROM: flexion ~ 155-mild challenge with eccentric lowering     Post Treatment Pain Level (on 0 to 10) scale:  1   / 10     ASSESSMENT  Assessment/Changes in Function:   Mild soreness Right A/C region with wall push ups and side lying shoulder ABD. Hold exercise per flow sheet secondary to mild muscle fatigue. Resume ex as tolerated     []  See Progress Note/Recertification   Patient will continue to benefit from skilled PT services to modify and progress therapeutic interventions, address functional mobility deficits, address ROM deficits, address strength deficits, analyze and address soft tissue restrictions, analyze and cue movement patterns and analyze and modify body mechanics/ergonomics to attain remaining goals. Progress toward goals / Updated goals:  Reassess for progress note next visit.      PLAN  [x]  Upgrade activities as tolerated YES Continue plan of care   []  Discharge due to :    []  Other:      Therapist: Farhana Sy PTA    Date: 1/4/2021 Time: 9:10  AM     Future Appointments   Date Time Provider Tucker Lara   1/4/2021  8:15 AM Meera Sears PTA [facility]   1/7/2021  7:45 AM Bishop Cazares PT [facility]

## 2021-01-07 ENCOUNTER — APPOINTMENT (OUTPATIENT)
Dept: PHYSICAL THERAPY | Age: 56
End: 2021-01-07
Payer: COMMERCIAL

## 2021-01-13 ENCOUNTER — HOSPITAL ENCOUNTER (OUTPATIENT)
Dept: PHYSICAL THERAPY | Age: 56
Discharge: HOME | End: 2021-01-13
Payer: COMMERCIAL

## 2021-01-13 PROCEDURE — 97140 MANUAL THERAPY 1/> REGIONS: CPT

## 2021-01-13 PROCEDURE — 97110 THERAPEUTIC EXERCISES: CPT

## 2021-01-13 NOTE — PROGRESS NOTES
Kavon Patrick [address]  [facility] AT [address] , [place], [address] - Phone: [phone] Fax: [phone]  DISCHARGE NOTE  Patient Name: Kamila Jacobs : 1965   Treatment/Medical Diagnosis: Right shoulder pain [M25.511]   Referral Source: Donna King MD     Date of Initial Visit: 20 Attended Visits: 27 Missed Visits: 0     SUMMARY OF TREATMENT  Physical Therapy treatment has consisted of Therapeutic exercise, Manual therapy, Modalities to improve strength, HEP, and ice. CURRENT STATUS  Patient had progressed well in PT, demonstrating improving strength, ROM, and functional ability. AROM Right shoulder flexion: 163- painfree, ABD:155 ,    CHANTAL: ~ T3, FIR: ~ T7  FOTO: 72 indicating improving functional ability  Functional improvement  ROM ~ 90%; strength: ~70%  Functional limitation lifting something > 10# overhead, pulling  Pain range: 0 to 4/10  Pt is independent in her D/C HEP. Goal/Measure of Progress Goal Met? 1. Functional (to T7 or >) pain free reach behind back (MRE)   Status at last Eval: ~ T 10 Current Status: FIR: ~ T7 yes   2. All ADLs without pain   Status at last Eval: Pain with ADLs Current Status: Pt reports performing ADLs without pain except for lifting heavier object onto a shelf 2/10 yes   3. Pt able to perform 10 push ups from her knees without pain   Status at last Eval: 10 wall push ups Current Status: Pt able to perform 2 sets 5x 1/2 knee ~ 50% of ROM Partially met   4. Pt to report GROC of >=+5, to indicate increased function   Status at last Eval: n/a Current Status: +6 A great deal better yes         RECOMMENDATIONS  Discharge to HEP. Patient met all goals/progressing toward goals. If you have any questions/comments please contact us directly at  [phone]  Thank you for allowing us to assist in the care of your patient.   LPTA Signature: Sarah Eugene PTA  Date: 2021   PT Signature:  Time: 7:36 AM   NOTE TO PHYSICIAN:  PLEASE COMPLETE THE ORDERS BELOW AND FAX TO   [facility] [place] [facility]: [phone]  If you are unable to process this request in 24 hours please contact our office:  [phone].    ___ I have read the above report and request that my patient continue as recommended.   ___ I have read the above report and request that my patient continue therapy with the following changes/special instructions:_________________________________________________________   ___ I have read the above report and request that my patient be discharged from therapy.      Physician Signature:        Date:       Time:

## 2021-01-13 NOTE — PROGRESS NOTES
PHYSICAL THERAPY - DAILY TREATMENT NOTE    Patient Name: Yobani Duran        Date: 2021  : 1965    Patient  Verified: YES  Visit #:     Insurance: Payor: Brenda Walters / Plan: Anali OLVERA / Product Type: Commerical /      In time: 7:30   Out time: 8:30    Total Treatment Time: 60      Medicare/[facility] Time Tracking (below)   Total Timed Codes (min):  - 1:1 Treatment Time:  -     TREATMENT AREA/ DIAGNOSIS = Right shoulder pain [M25.511]    SUBJECTIVE  Pain Level (on 0 to 10 scale):  2  / 10   Medication Changes/New allergies or changes in medical history, any new surgeries or procedures? NO    If yes, update Summary List   Subjective Functional Status/Changes:  []  No changes reported     Functional improvement  ROM ~ 90%; strength: ~70%  Functional limitation lifting something > 10# overhead, pulling  Pain range: 0 to 4/10     OBJECTIVE  Modalities Rationale:     decrease edema, decrease inflammation and decrease pain to improve patient's ability to perform ADLs without pain                min []? Estim, type/location:                                                            []?  att     []?  unatt     []?  w/US     []?  w/ice    []?  w/heat     min []? Mechanical Traction: type/lbs                    []?  pro   []?  sup   []? int   []?  cont    []? before manual    []? after manual     min []? Ultrasound, settings/location:        min []? Iontophoresis w/ dexamethasone, location:                                                []?  take home patch       []? in clinic   10 min [x]? Ice     []? Heat    location/position:       min []? Vasopneumatic Device, press/temp:       min []? Other:     [x]? Skin assessment post-treatment (if applicable):    [x]? intact    []? redness- no adverse reaction     []? redness  adverse reaction:          8 min Manual Therapy: STM to R shoulder, PROM to R shoulder,  rythmic stabs in scapular plane    Rationale:      decrease pain, increase ROM and increase tissue extensibility to improve patient's ability to perform ADLs without pain  The manual therapy interventions were performed at a separate and distinct time from the therapeutic activities interventions. 42 min Therapeutic Exercise:  [x]  See flow sheet   Rationale:      increase ROM and increase strength to improve the patients ability to perform ADLs without pain     Billed With/As:   [x] TE   [] TA   [] Neuro   [] Self Care Patient Education: [x] Review HEP    [] Progressed/Changed HEP based on:   [] positioning   [] body mechanics   [] transfers   [] heat/ice application    [] other:        Other Objective/Functional Measures: FOTO:72     Post Treatment Pain Level (on 0 to 10) scale:  1   / 10     ASSESSMENT  Assessment/Changes in Function:   See discharge     [x]  See Progress Note/Recertification   Patient will continue to benefit from skilled PT services to modify and progress therapeutic interventions, address functional mobility deficits, address ROM deficits, address strength deficits, analyze and address soft tissue restrictions, analyze and cue movement patterns and analyze and modify body mechanics/ergonomics to attain remaining goals. Progress toward goals / Updated goals:  See discharge     PLAN  []  Upgrade activities as tolerated No Continue plan of care   [x]  Discharge due to : Pt progressing toward goals/met goals   []  Other:      Therapist: Vinita Hoskins PTA    Date: 1/13/2021 Time:  8:30 AM     No future appointments.

## 2021-09-14 NOTE — PROGRESS NOTES
AMBULATORY PROGRESS NOTE      Patient: Gia Valencia             MRN: [id number]     SSN: [id number] Body mass index is 33.47 kg/m². YOB: 1965     AGE: 63 y.o. EX: female    PCP: Unknown (Inactive)       IMPRESSION //  DIAGNOSIS AND TREATMENT PLAN        Gia Valencia has a diagnosis of:      As such, in evaluating her today, impression, is that she has swelling discomfort to the posterior medial portion of her left hindfoot, and she has posterior tibial tendinitis, that began more recently, after and during the rehabilitation, after her right gastroc Jose procedure. She had a right gastroc Jose procedure, performed by Dr. Federico Barksdale, [address] [place] [address] orthopedics, roughly August 11, 2021. She has been rehabilitating, her right ankle and right Achilles tendon, complex, and arrives here weightbearing as tolerated, without use of a cam walker boot and she is scheduled to see him, Dr. Federico Barksdale, of [address] [place] [address], the next several weeks as relates to her right Achilles tendon region. She is seen here today, also, complaining of posterior medial left hindfoot pain that began during her rehabilitation process, so sometime after August 11, 2021. No history of trauma or from a fluoroquinolone exposure. After evaluating her my patient has left posterior tibial tendinitis,    Initially, recommendation would be for a medially posted orthotic, over-the-counter orthotic. Should she not improve, she is can require an MRI of her left ankle to assess the integrity of her left posterior tibial tendon. As she was satisfied, that I answered all of her questions, and we want obviously to do the best things for her. DIAGNOSES    1. Chronic pain of left ankle    2. Posterior tibial tendinitis of left lower extremity    3. Congenital contracture of right gastrocnemius muscle    4.  Achilles tendinitis of right lower extremity        Orders Placed This Encounter    Generic Supply Order     SpencoTotal Max Arch Support (LMS)    AMB POC XRAY, ANKLE; COMPLETE, 3+ VIE     ASK ALL FEMALE PATIENTS IF THEY ARE PREGNANT     Order Specific Question:   Reason for Exam     Answer:   PAIN        PLAN:    1. Patient plans on seeing Evans Zapien later this month: for her posterolateral ankle pain  2. Obtained 3-View XR of right ankle. 3. Advised pt to do runner's stretch  4. DME: Spenco Total Max Arch Support (LMS)        RTO-  1 month    Carmen Araujo  expresses understanding of the diagnosis, treatment plan, and all of their proposed questions were answered to their satisfaction. Patient education has been provided re the diagnoses. HPI //  Catrachita Aguiar IS A 63 y.o. female who is a/an  new patient, presenting to my outpatient office for evaluation of  the following chief complaint(s):     Chief Complaint   Patient presents with    Foot Pain     right foot    Swelling       Patient presents today w/ bilateral foot pain /swelling every since she had GSR on her right lower extremity on 08/11/2021. The right foot pain has gotten progressively worse since the surgery. She was given a CAM walker boot after surgery and has since weaned off it. Walking exacerbates her right foot pain and limps sometimes because of this right foot pain. She also mentions intermittent shooting anteromedial pain/swelling of her left posterior medial left ankle. The left posterior medial ankle pain, began sometime after August 11, 2021, from the time she is been rehabilitating from her right Achilles tendon surgery. She was seeing  at [address] [place] [address]. Visit Vitals  Pulse 64   Temp 97.8 °F (36.6 °C) (Temporal)   Ht 5' 4\" (1.626 m)   Wt 195 lb (88.5 kg)   SpO2 94%   BMI 33.47 kg/m²       Appearance: Alert, well appearing and pleasant patient who is in no distress, oriented to person, place/time, and who follows commands. This patient is accompanied in the examination room by her  self. There is signs of: no dementia  Psychiatric: Affect/mood are appropriate. Speech normal in context and clarity, memory intact grossly, no involuntary movements - tremors. Patient arrives to office via: without assistive device:   H EENT (2): Head normocephalic & atraumatic. Eye: pupils are round// EOM are intact // Neck: ROM WNL  // Hearings Intact   Respiratory: Breathing non labored     . ANKLE/FOOT left    Psychiatry: Alert, Oriented x 3; Speech normal in context and clarity,            Memory intact grossly, no involuntary movements - tremors, no dementia  Visit Vitals  Pulse 64   Temp 97.8 °F (36.6 °C) (Temporal)   Ht 5' 4\" (1.626 m)   Wt 195 lb (88.5 kg)   SpO2 94%   BMI 33.47 kg/m²      Gait: slow  Tenderness: mild tenderness to posterior tibial tendon,            no to plantar fascia, no PM spring ligament, no  for calf or gastrocnemius muscle regions  Cutaneous: mild swelling to posterior tibial tendon,            otherwise WNL   Joint Motion: Normal ROM noted to ankle/foot,  Inversion motion is not diminished. Joint / Tendon Stability: No Ankle or Subtalar instability or joint laxity.                        No peroneal sublux ability or dislocation  Alignment: Hindfoot remains in neutral position with single stance rise attempt    Hindfoot alignment when patient seated: neutral         Calcaneo fibular Impingement is not present with weight bearing   Abduction of hindfoot at TN not present, midfoot not present          Medial column collapse not present, planovalgus alignment is not present          Forefoot compensatory Varus is not present  Contractures:  Achilles not present, Gastrocnemius Contractures not present  Neuro Motor/Sensory: normal 5/5 strength in all tested muscle groups, no muscle wasting or atrophy and no fasciculations noted // normal light touch sensation,   Vascular: NL foot/ankle pulses  Lymphatics: No extremity lymphedema, No calf swelling, no tenderness to calf muscles. CHART REVIEW     Isa Escoto has been experiencing pain and discomfort confirmed as outlined in the pain assessment outlined below.  was reviewed by Alex Andres MD on 9/15/2021. Pain Assessment  9/15/2021   Location of Pain Foot; Ankle   Location Modifiers Left   Severity of Pain 7   Quality of Pain Sharp; Aching   Frequency of Pain Intermittent   Date Pain First Started (No Data)   Date Pain First Started Comment may 2021   Aggravating Factors Walking;Bending;Stretching;Straightening   Limiting Behavior Yes   Relieving Factors Rest        Isa Cartagena  has no past medical history on file. Patients is employed at:         History reviewed. No pertinent past medical history. Past Surgical History:   Procedure Laterality Date    FOOT/TOES SURGERY PROC UNLISTED Right 08/11/2021    HX SHOULDER REPLACEMENT Right 2020    MA ANESTH,SURGERY OF SHOULDER Right     MA ANESTH,SURGERY OF SHOULDER Left      No current outpatient medications on file. No current facility-administered medications for this visit. Allergies   Allergen Reactions    Keflex [Cephalexin] Swelling    Lisinopril Swelling     Social History     Occupational History    Not on file   Tobacco Use    Smoking status: Never Smoker    Smokeless tobacco: Never Used   Substance and Sexual Activity    Alcohol use: Not on file    Drug use: Not on file    Sexual activity: Not on file     History reviewed. No pertinent family history. DIAGNOSTIC LAB DATA      No results found for: HBA1C, CLC0TROU, GFA2UJMU // No results found for: GLU, GLUCPOC     No results found for: CHS7LFFK, ETO6JRDO      No results found for: VITD3, XQVID2, XQVID3, XQVID, VD3RIA, YVLN08SPEIE      REVIEW OF SYSTEMS : 9/15/2021  ALL BELOW ARE Negative except : SEE HPI     All other systems reviewed and are negative. 12 point review of systems otherwise negative unless noted in HPI.       DIAGNOSTIC IMAGING /ORDERS       Orders Placed This Encounter    Generic Supply Order     SpencoTotal Max Arch Support (LMS)    AMB POC XRAY, ANKLE; COMPLETE, 3+ VIE     ASK ALL FEMALE PATIENTS IF THEY ARE PREGNANT     Order Specific Question:   Reason for Exam     Answer:   PAIN        ANKLE X RAYS 3 VIEWS LEFT  X RAYS AT [facility]  9/15/2021    NON WEIGHT BEARING    X RAYS AT [facility]  9/15/2021    Bones: No fractures or dislocations. No focal osteolytic or osteoblastic process     Bone Spurs: No significant bone spurs  Alignment: Ankle mortise alignment is congruent, Tibial plafond and talar dome intact. No Osteochondral defects seen   Joint: No Significant OA changes present  Soft Tissues: Mild soft to swelling, seen the medial portion of the left ankle to be malleolus . // no radiopaque foreign body     No abnormal calcific densities to soft tissues    No ankle joint effusion in lateral projection. Mineralization: Suggests no Osteopenia    I have personally reviewed the results of the above study. The interpretation of this study is my professional opinion             On this date 09/15/2021 I have spent 35 minutes reviewing previous notes, test results and face to face with the patient discussing the diagnosis and importance of compliance with the treatment plan as well as documenting on the day of the visit. An electronic signature was used to authenticate this note. Disclaimer: Sections of this note are dictated using utilizing voice recognition software, which may have resulted in some phonetic based errors in grammar and contents. Even though attempts were made to correct all the mistakes, some may have been missed, and remained in the body of the document. If questions arise, please contact our department. Anna Sevilla may have a reminder for a \"due or due soon\" health maintenance. I have asked that she contact her primary care provider for follow-up on this health maintenance.     Columba Stacy Banda, as dictated byChalino.   9/15/2021  1:23 PM

## 2021-09-15 ENCOUNTER — OFFICE VISIT (OUTPATIENT)
Dept: ORTHOPEDIC SURGERY | Age: 56
End: 2021-09-15
Payer: COMMERCIAL

## 2021-09-15 VITALS
TEMPERATURE: 97.8 F | WEIGHT: 195 LBS | BODY MASS INDEX: 33.29 KG/M2 | OXYGEN SATURATION: 94 % | HEIGHT: 64 IN | HEART RATE: 64 BPM

## 2021-09-15 DIAGNOSIS — Q79.8 CONGENITAL CONTRACTURE OF RIGHT GASTROCNEMIUS MUSCLE: ICD-10-CM

## 2021-09-15 DIAGNOSIS — G89.29 CHRONIC PAIN OF LEFT ANKLE: Primary | ICD-10-CM

## 2021-09-15 DIAGNOSIS — M25.572 CHRONIC PAIN OF LEFT ANKLE: Primary | ICD-10-CM

## 2021-09-15 DIAGNOSIS — M76.822 POSTERIOR TIBIAL TENDINITIS OF LEFT LOWER EXTREMITY: ICD-10-CM

## 2021-09-15 DIAGNOSIS — M76.61 ACHILLES TENDINITIS OF RIGHT LOWER EXTREMITY: ICD-10-CM

## 2021-09-15 PROCEDURE — 73610 X-RAY EXAM OF ANKLE: CPT | Performed by: ORTHOPAEDIC SURGERY

## 2021-09-15 PROCEDURE — 99203 OFFICE O/P NEW LOW 30 MIN: CPT | Performed by: ORTHOPAEDIC SURGERY

## 2021-10-13 ENCOUNTER — OFFICE VISIT (OUTPATIENT)
Dept: ORTHOPEDIC SURGERY | Age: 56
End: 2021-10-13
Payer: COMMERCIAL

## 2021-10-13 VITALS
HEART RATE: 81 BPM | BODY MASS INDEX: 33.29 KG/M2 | HEIGHT: 64 IN | OXYGEN SATURATION: 98 % | WEIGHT: 195 LBS | TEMPERATURE: 97.3 F

## 2021-10-13 DIAGNOSIS — M76.822 POSTERIOR TIBIAL TENDINITIS OF LEFT LOWER EXTREMITY: Primary | ICD-10-CM

## 2021-10-13 PROCEDURE — 99214 OFFICE O/P EST MOD 30 MIN: CPT | Performed by: ORTHOPAEDIC SURGERY

## 2021-10-13 NOTE — PROGRESS NOTES
AMBULATORY PROGRESS NOTE      Patient: Vicenta Winn             MRN: [id number]     SSN: [id number] Body mass index is 33.47 kg/m². YOB: 1965     AGE: 61 y.o. EX: female    PCP: Unknown (Inactive)       IMPRESSION //  DIAGNOSIS AND TREATMENT PLAN        Vicenta Winn has a diagnosis of:        Sutures no she has longstanding posterior medial left hindfoot pain, despite anti-inflammatories, active modification, shoewear changes, she still having disabling pain to the posterior medial hindfoot along the posterior tibial tendon. She has a Spenco, total max arch support, that still holding her up sufficiently, but she is still having tenderness on examination. An MRI of her left ankle is indicated, to assess the tibialis posterior tibial tendon, spring ligament, deltoid complex, to see if there is any attenuation of these structures that is contributing to her posterior medial hindfoot pain and full reproducible tenderness    DIAGNOSES    1. Posterior tibial tendinitis of left lower extremity        Orders Placed This Encounter    MRI ANKLE LT WO CONT     Standing Status:   Future     Standing Expiration Date:   11/13/2021     Order Specific Question:   Arthrogram study     Answer:   No        PLAN:    1. MRI of left ankle: assess posterior tib , spring ligament, and deltoid ligament        RTO-  After MRI    Isa Cartagena  expresses understanding of the diagnosis, treatment plan, and all of their proposed questions were answered to their satisfaction. Patient education has been provided re the diagnoses. HPI //  [address] Aguiar IS A 61 y.o. female who is a/an  established patient, presenting to my outpatient office for evaluation of  the following chief complaint(s):     Chief Complaint   Patient presents with    Ankle Pain     left ankle       At [facility] pt presented w/ left ankle/foot pain.  Patient plans on seeing Troy Oates later this month: for her posterolateral ankle pain. Obtained 3-View XR of right ankle. Advised pt to do runner's stretch. DME: Spenco Total Max Arch Support (LMS).       Since [facility] pt continues to endorse left ankle pain. She has Spenco Total Max arch support. She recalls stepping wrong and hearing a crack. Visit Vitals  Pulse 81   Temp 97.3 °F (36.3 °C) (Temporal)   Ht 5' 4\" (1.626 m)   Wt 195 lb (88.5 kg)   SpO2 98%   BMI 33.47 kg/m²       Appearance: Alert, well appearing and pleasant patient who is in no distress, oriented to person, place/time, and who follows commands. This patient is accompanied in the examination room by her  self. There is signs of: no dementia  Psychiatric: Affect/mood are appropriate. Speech normal in context and clarity, memory intact grossly, no involuntary movements - tremors. Patient arrives to office via: with assistive device: Spenco Total Max Arch Supports  H EENT (2): Head normocephalic & atraumatic. Eye: pupils are round// EOM are intact // Neck: ROM WNL  // Hearings Intact   Respiratory: Breathing non labored     ANKLE/FOOT left    Gait: uses assistive device  Spenco Total Max Arch Support   Tenderness: moderate    posterior tibial tendon  Cutaneous: There is a mild swelling close to medial ankle along the left hindfoot. Joint Motion:   WNL //      Joint / Tendon Stability: No Ankle or Subtalar instability or joint laxity. No peroneal sublux ability or dislocation  Alignment: neutral Hindfoot,    Neuro Motor/Sensory: NL/NL  Vascular: NL foot/ankle pulses,   Lymphatics: No extremity lymphedema, No calf swelling, no tenderness to calf muscles. CHART REVIEW     Isa Shankar has been experiencing pain and discomfort confirmed as outlined in the pain assessment outlined below.  was reviewed by Bihn Carroll MD on 10/13/2021.      Pain Assessment  10/13/2021   Location of Pain Ankle   Location Modifiers Left   Severity of Pain 5   Quality of Pain Sharp   Frequency of Pain -   Date Pain First Started -   Date Pain First Started Comment -   Aggravating Factors -   Limiting Behavior -   Relieving Factors -        Isa Cartagena  has no past medical history on file. Patients is employed at:         History reviewed. No pertinent past medical history. Past Surgical History:   Procedure Laterality Date    FOOT/TOES SURGERY PROC UNLISTED Right 08/11/2021    HX SHOULDER REPLACEMENT Right 2020    SC ANESTH,SURGERY OF SHOULDER Right     SC ANESTH,SURGERY OF SHOULDER Left      No current outpatient medications on file. No current facility-administered medications for this visit. Allergies   Allergen Reactions    Keflex [Cephalexin] Swelling    Lisinopril Swelling     Social History     Occupational History    Not on file   Tobacco Use    Smoking status: Never Smoker    Smokeless tobacco: Never Used   Substance and Sexual Activity    Alcohol use: Not on file    Drug use: Not on file    Sexual activity: Not on file     History reviewed. No pertinent family history. DIAGNOSTIC LAB DATA      No results found for: HBA1C, AQA6BJAQ, MLZ8BKCE // No results found for: GLU, GLUCPOC     No results found for: QCP4PFWS, ECJ5UJDH      No results found for: VITD3, XQVID2, XQVID3, XQVID, VD3RIA, [id number]     Drug Screen Most Recent Result Date    No resulted procedures found. REVIEW OF SYSTEMS : 10/13/2021  ALL BELOW ARE Negative except : SEE HPI     All other systems reviewed and are negative. 12 point review of systems otherwise negative unless noted in HPI. DIAGNOSTIC IMAGING /ORDERS       Orders Placed This Encounter    MRI ANKLE LT WO CONT     Standing Status:   Future     Standing Expiration Date:   11/13/2021     Order Specific Question:   Arthrogram study     Answer:   No            I have reviewed the results of the above study. The interpretation of this study is my professional opinion.             On this date 10/13/2021 I have spent 30 minutes reviewing previous notes, test results and face to face with the patient discussing the diagnosis and importance of compliance with the treatment plan as well as documenting on the day of the visit. An electronic signature was used to authenticate this note. Disclaimer: Sections of this note are dictated using utilizing voice recognition software, which may have resulted in some phonetic based errors in grammar and contents. Even though attempts were made to correct all the mistakes, some may have been missed, and remained in the body of the document. If questions arise, please contact our department. Cristy Carpio may have a reminder for a \"due or due soon\" health maintenance. I have asked that she contact her primary care provider for follow-up on this health maintenance. Ronak Bosch, as dictated by, Benita Jc.   10/13/2021  8:36 AM

## 2021-10-25 ENCOUNTER — HOSPITAL ENCOUNTER (OUTPATIENT)
Dept: MRI IMAGING | Age: 56
Discharge: HOME | End: 2021-10-25
Attending: ORTHOPAEDIC SURGERY
Payer: COMMERCIAL

## 2021-10-25 DIAGNOSIS — M76.822 POSTERIOR TIBIAL TENDINITIS OF LEFT LOWER EXTREMITY: ICD-10-CM

## 2021-10-25 PROCEDURE — 73721 MRI JNT OF LWR EXTRE W/O DYE: CPT

## 2021-11-16 ENCOUNTER — OFFICE VISIT (OUTPATIENT)
Dept: ORTHOPEDIC SURGERY | Age: 56
End: 2021-11-16
Payer: COMMERCIAL

## 2021-11-16 VITALS
BODY MASS INDEX: 34.15 KG/M2 | HEIGHT: 64 IN | HEART RATE: 60 BPM | OXYGEN SATURATION: 97 % | WEIGHT: 200 LBS | TEMPERATURE: 97.7 F

## 2021-11-16 DIAGNOSIS — M76.822 POSTERIOR TIBIAL TENDINITIS OF LEFT LOWER EXTREMITY: Primary | ICD-10-CM

## 2021-11-16 PROCEDURE — 99214 OFFICE O/P EST MOD 30 MIN: CPT | Performed by: ORTHOPAEDIC SURGERY

## 2021-11-16 RX ORDER — LATANOPROSTENE BUNOD 0.24 MG/ML
SOLUTION/ DROPS OPHTHALMIC
COMMUNITY
Start: 2021-10-06

## 2021-11-16 RX ORDER — IBUPROFEN 200 MG
TABLET ORAL
COMMUNITY

## 2021-11-16 NOTE — PROGRESS NOTES
AMBULATORY PROGRESS NOTE      Patient: Marlys Dc             MRN: [id number]     SSN: [id number] Body mass index is 34.33 kg/m². YOB: 1965     AGE: 61 y.o. EX: female    PCP: Unknown (Inactive)       IMPRESSION //  DIAGNOSIS AND TREATMENT PLAN        Marlys Dc has a diagnosis of:     Explained her that her MRI does shows tendinitis, but does not show any tendinopathy or tear to the posterior tibial tendon, or any tearing to the spring ligament. Blaze Sanchez MD has ordered a custom brace or DME product for Marlys Dc . This will be customized and made for you by an outside facility. I am requesting that you contact the Orthotist company provided below in order to have the prescription made.  PROSTHETICS AND ORTHOTICS      Isa Cartagena is in need of CUSTOM   Left     1. Unilateral, :  MEDIAL POSTED, SMO Left    2. GOAL OF TREATMENT TO: to provide M/L stability, optimal arch support, and limit ML/AP motion. Isa Cartagena needs tri planar control (Medial / Lateral stability, optimal arch support, and limited Medial/Lateral // Anterior/Posterior Motion) of the foot and ankle in order to improve anatomical alignment, protect/control motion, and to relieve pain. 3. Marlys Dc has tried other orthopaedic devices (Spenco over-the-counter inserts, Ace wrap's, shoewear changes) and each of these has been either ill fitting, poorly tolerated, provided incomplete support, provided insufficient  pain relief. This newly posted SMO brace, will support the medial column of her foot and ankle    Please look favorably upon Isa Cartagena and please assist in covering the financial expense of the custom DME. Blaez Sanchez MD  11/16/2021  3:41 PM        DIAGNOSES    1.  Posterior tibial tendinitis of left lower extremity        Orders Placed This Encounter    Generic Supply Order     L Custom medially posted SMO brace    ibuprofen (Motrin IB) 200 mg tablet     Sig: Take  by mouth.  Vyzulta 0.024 % drop            PLAN:    1. DME: L Custom medially posted SMO    RTO : 7  weeks    There are no Patient Instructions on file for this visit. Please follow up with your PCP for any health maintenance as recommended         Ag Sutherland  expresses understanding of the diagnosis, treatment plan, and all of their proposed questions were answered to their satisfaction. Patient education has been provided re the diagnoses. HPI //  OBJECTIVE EXAMINATION        Isa Cartagena IS A 61 y.o. female who is a/an  established patient, presenting to my outpatient office for evaluation of  the following chief complaint(s):     No chief complaint on file. Ag Sutherland presents today for follow-up of test results. She continues to endorse left posterior tibial tendinitis. She states she can walk about a 1-1.5 miles before she starts to have pain. She mentions the pain has started to radiate up her lower left leg. Visit Vitals  Pulse 60   Temp 97.7 °F (36.5 °C) (Temporal)   Ht 5' 4\" (1.626 m)   Wt 200 lb (90.7 kg)   SpO2 97%   BMI 34.33 kg/m²       Appearance: Alert, well appearing and pleasant patient who is in no distress, oriented to person, place/time, and who follows commands. Normal dress/motor activity/thought processes/memory. This patient is accompanied in the examination room by her  self. Patient arrives to office via: without assistive device:   Psychiatric:  Normal Affect/mood. Judgement, behavior, and conduct are appropriate. Speech normal in context and clarity, memory intact grossly, no involuntary movements - tremors. H EENT (2): Head normocephalic & atraumatic. Eye: pupils are round// EOM are intact // Neck: ROM WNL  // Hearings Intact   Respiratory: Breathing non labored     ANKLE/FOOT left    Gait: normal  Tenderness: mild over  posterior tibial tendon  Cutaneous:   WNL.   Joint Motion:   WNL   Joint / Tendon Stability:  No Ankle or Subtalar instability or joint laxity. No peroneal sublux ability or dislocation  Alignment: neutral Hindfoot,    Neuro Motor/Sensory: NL/NL  Vascular: NL foot/ankle pulses,   Lymphatics: No extremity lymphedema, No calf swelling, no tenderness to calf muscles. CHART REVIEW     Isa Moses has been experiencing pain and discomfort confirmed as outlined in the pain assessment outlined below.  was reviewed by Anny Yan MD on 11/16/2021. Pain Assessment  11/16/2021   Location of Pain Ankle   Location Modifiers Left   Severity of Pain 3   Quality of Pain Dull   Duration of Pain Other (Comment)   Duration of Pain Comment Depends on how long she is on her feet   Frequency of Pain Constant   Date Pain First Started -   Date Pain First Started Comment -   Aggravating Factors Walking;Standing;Stairs   Limiting Behavior Yes   Relieving Factors Elevation;NSAID   Result of Injury No        Isa Cartagena  has no past medical history on file. Patients is employed at:         History reviewed. No pertinent past medical history. Past Surgical History:   Procedure Laterality Date    FOOT/TOES SURGERY PROC UNLISTED Right 08/11/2021    HX SHOULDER REPLACEMENT Right 2020    RI ANESTH,SURGERY OF SHOULDER Right     RI ANESTH,SURGERY OF SHOULDER Left      Current Outpatient Medications   Medication Sig    ibuprofen (Motrin IB) 200 mg tablet Take  by mouth.  Vyzulta 0.024 % drop      No current facility-administered medications for this visit. Allergies   Allergen Reactions    Keflex [Cephalexin] Swelling    Lisinopril Swelling     Social History     Occupational History    Not on file   Tobacco Use    Smoking status: Never Smoker    Smokeless tobacco: Never Used   Substance and Sexual Activity    Alcohol use: Not on file    Drug use: Not on file    Sexual activity: Not on file     History reviewed. No pertinent family history. DIAGNOSTIC LAB DATA      No results found for: HBA1C, WWR5LJIW, OGG5NFPU // No results found for: GLU, GLUCPOC     No results found for: CZM0YJZJ, FSS9CKGU      No results found for: VITD3, XQVID2, XQVID3, XQVID, VD3RIA, JDCB28OICBL     Drug Screen Most Recent Result Date    No resulted procedures found. REVIEW OF SYSTEMS : 11/16/2021  ALL BELOW ARE Negative except : SEE HPI     All other systems reviewed and are negative. 12 point review of systems otherwise negative unless noted in HPI. RADIOGRAPHS// IMAGING//DIAGNOSTIC DATA     Orders Placed This Encounter    Generic Supply Order    ibuprofen (Motrin IB) 200 mg tablet    Vyzulta 0.024 % drop            MRI Results (most recent):  Results from Hospital Encounter encounter on 10/25/21    MRI ANKLE LT WO CONT    Narrative  MRI ANKLE LT WO CONT: 10/25/2021 8:40 PM    CLINICAL INFORMATION  Posterior tibial tendinitis. Posterior medial hindfoot pain. History of right gastroc Jose procedure in  August 2021. COMPARISON  None. TECHNIQUE  Multiplane and multisequence MR images of the ankle. FINDINGS  OSSEOUS STRUCTURES  There are no fractures. JOINTS  Small tibiotalar, calcaneocuboid as well as a small talonavicular joint effusion  with extrusion adjacent to the extensor retinaculum. LATERAL COMPARTMENT  Ligaments: Poor resolution of the ATFL. Tibia/fibula syndesmosis, PTFL and CFL  are normal.  Peroneal tendons: Normal.    MEDIAL COMPARTMENT  Ligaments: Normal.  Medial tendons: Small amount of fluid within the tendon sheath of the posterior  tibialis tendon. No significant thickening or abnormal signal within the  posterior tibialis tendon. FDL and FHL tendons are normal.  Tarsal tunnel: Normal.    ANTERIOR COMPARTMENT  Extensor tendons: Normal.    POSTERIOR COMPARTMENT  Achilles tendon: Normal.  Plantar fascia: Normal.  Kagers fat-pad: Normal.    SINUS TARSI  Normal.    MUSCLES  Normal muscle volume and signal intensity.     SOFT TISSUES  Mild subcutaneous edema over the posterior medial ankle. Impression  1. Posterior tibial tendon tenosynovitis with mild overlying subcutaneous edema  in the posterior medial ankle. 2. Poor resolution of the ATFL which may be sequela of prior trauma. 3. Small tibiotalar, calcaneocuboid and talonavicular joint effusions. Attending statement: I have personally reviewed both the study and this report  and concur with the above stated findings. I have personally reviewed the images of the above study. The interpretation of this study is my professional opinion            I have spent 30 minutes reviewing the previous notes, reviewing diagnostic studies [Advanced  Imaging, Diagnostic test results (x-rays)] and had a direct face to face with the patient discussing the diagnosis and importance of compliance with the treatment and plan. There is  discussion for the potential for surgery, answering all questions, as well as documenting patient care coordination for this individual on the day of the visit. Disclaimer:     Sections of this note are dictated using utilizing voice recognition software, which may have resulted in some phonetic based errors in grammar and contents. Even though attempts were made to correct all the mistakes, some may have been missed, and remained in the body of the document. If questions arise, please contact our department. An electronic signature was used to authenticate this note. Larissa Montenegro may have a reminder for a \"due or due soon\" health maintenance. I have asked that she contact her primary care provider for follow-up on this health maintenance. There are no Patient Instructions on file for this visit. Please follow up with your PCP for any health maintenance as recommended. Sg Roger, as dictated byTaylor.   11/16/2021  8:23 AM

## 2021-12-03 ENCOUNTER — PATIENT MESSAGE (OUTPATIENT)
Dept: ORTHOPEDIC SURGERY | Age: 56
End: 2021-12-03

## 2021-12-03 RX ORDER — SULFAMETHOXAZOLE AND TRIMETHOPRIM 800; 160 MG/1; MG/1
1 TABLET ORAL 2 TIMES DAILY
Qty: 28 TABLET | Refills: 0 | Status: CANCELLED | OUTPATIENT
Start: 2021-12-03

## 2021-12-03 RX ORDER — DICLOFENAC SODIUM 75 MG/1
75 TABLET, DELAYED RELEASE ORAL 2 TIMES DAILY WITH MEALS
Qty: 30 TABLET | Refills: 0 | Status: SHIPPED | OUTPATIENT
Start: 2021-12-03 | End: 2022-01-04 | Stop reason: SDUPTHER

## 2021-12-03 NOTE — TELEPHONE ENCOUNTER
This was last given by another provider. Please sign if appropriate. Last Visit: 11/16/21 with MD Arnett  Next Appointment: none    Requested Prescriptions     Pending Prescriptions Disp Refills    diclofenac EC (VOLTAREN) 75 mg EC tablet 30 Tablet 0     Sig: Take 1 Tablet by mouth two (2) times daily (with meals).        From outside meds tab:

## 2022-01-04 RX ORDER — DICLOFENAC SODIUM 75 MG/1
TABLET, DELAYED RELEASE ORAL
Qty: 30 TABLET | Refills: 0 | Status: SHIPPED | OUTPATIENT
Start: 2022-01-04 | End: 2022-03-22

## 2022-01-11 NOTE — PROGRESS NOTES
AMBULATORY PROGRESS NOTE      Patient: Sunshine Salazar             MRN: [id number]     SSN: [id number] Body mass index is 33.81 kg/m². YOB: 1965     AGE: 61 y.o. EX: female    PCP: Unknown (Inactive)       IMPRESSION //  DIAGNOSIS AND TREATMENT PLAN        Sunshine Salazar has a diagnosis of:        Better, using her SMO brace, nursing in 2 months time. I am hoping to wean her off from the asthma brace back to her total max Spenco arch support which she already has. DIAGNOSES    1. Posterior tibial tendinitis of left lower extremity    2. Chronic pain of left ankle    3. Congenital contracture of right gastrocnemius muscle        Orders Placed This Encounter    cholecalciferol (VITAMIN D3) (400 Units /10 mcg) tab tablet     Sig: Take  by mouth. PLAN:    1. I advised the patient to continue using the L Custom medially posted SMO brace for an additional 2 months  2. I advise the patient to gradually wean into Spenco Total Max Arch Supports  3. I instruct the patient to bring the Spenco Total Max Arch Supports      RTO: 2 months    There are no Patient Instructions on file for this visit. Please follow up with your PCP for any health maintenance as recommended         Sunshine Salazar  expresses understanding of the diagnosis, treatment plan, and all of their proposed questions were answered to their satisfaction. Patient education has been provided re the diagnoses. HPI //  Catrachita Aguiar IS A 61 y.o. female who is a/an  established patient, presenting to my outpatient office for evaluation of  the following chief complaint(s):     Chief Complaint   Patient presents with    Ankle Pain     left       At [facility] presented w/ left posterior tibial tendinitis. DME: L Custom medially posted SMO. Since [facility] states she is doing better.  She states the L Custom medially posted SMO provides immediate relief from left ankle pain. She mentions she had the [address] modified because part of the brace was digging into her skin. Visit Vitals  Pulse 78   Temp 97.3 °F (36.3 °C) (Temporal)   Ht 5' 4\" (1.626 m)   Wt 197 lb (89.4 kg)   SpO2 100%   BMI 33.81 kg/m²       Appearance: Alert, well appearing and pleasant patient who is in no distress, oriented to person, place/time, and who follows commands. Normal dress/motor activity/thought processes/memory. This patient is accompanied in the examination room by her  self. Patient arrives to office via: with assistive device: L SMO medially posted brace    Psychiatric:  Normal Affect/mood. Judgement, behavior, and conduct are appropriate. Speech normal in context and clarity, memory intact grossly, no involuntary movements - tremors. H EENT (2): Head normocephalic & atraumatic. Eye: pupils are round// EOM are intact // Neck: ROM WNL  // Hearings Intact   Respiratory: Breathing non labored      ANKLE/FOOT left    Gait: uses assistive device L SMO medially posted brace  Tenderness: active eversion caused medial left ankle pain  Cutaneous:   WNL. Joint Motion:   WNL   Joint / Tendon Stability:  No Ankle or Subtalar instability or joint laxity. No peroneal sublux ability or dislocation  Alignment: neutral Hindfoot,    Neuro Motor/Sensory: NL/NL  Vascular: NL foot/ankle pulses,   Lymphatics: No extremity lymphedema, No calf swelling, no tenderness to calf muscles. CHART REVIEW     Isa Pacheco has been experiencing pain and discomfort confirmed as outlined in the pain assessment outlined below.  was reviewed by Kathrine Huntley MD on 1/18/2022. Pain Assessment  1/18/2022   Location of Pain Ankle   Location Modifiers Left   Severity of Pain 2   Quality of Pain Aching; Throbbing   Duration of Pain A few minutes   Duration of Pain Comment -   Frequency of Pain Intermittent   Date Pain First Started -   Date Pain First Started Comment -   Aggravating Factors Stretching;Straightening;Exercise;Walking;Standing   Limiting Behavior Yes   Relieving Factors Rest   Relieving Factors Comment voltaren   Result of Injury Yes   Work-Related Injury No        Isa Cartagena  has no past medical history on file. Patients is employed at:         History reviewed. No pertinent past medical history. Past Surgical History:   Procedure Laterality Date    FOOT/TOES SURGERY PROC UNLISTED Right 08/11/2021    HX SHOULDER REPLACEMENT Right 2020    DC ANESTH,SURGERY OF SHOULDER Right     DC ANESTH,SURGERY OF SHOULDER Left      Current Outpatient Medications   Medication Sig    cholecalciferol (VITAMIN D3) (400 Units /10 mcg) tab tablet Take  by mouth.  diclofenac EC (VOLTAREN) 75 mg EC tablet TAKE 1 TABLET BY MOUTH TWICE DAILY WITH MEALS    ibuprofen (Motrin IB) 200 mg tablet Take  by mouth.  Vyzulta 0.024 % drop      No current facility-administered medications for this visit. Allergies   Allergen Reactions    Keflex [Cephalexin] Swelling    Lisinopril Swelling     Social History     Occupational History    Not on file   Tobacco Use    Smoking status: Never Smoker    Smokeless tobacco: Never Used   Substance and Sexual Activity    Alcohol use: Not on file    Drug use: Not on file    Sexual activity: Not on file     History reviewed. No pertinent family history. DIAGNOSTIC LAB DATA      No results found for: HBA1C, LBE5PFUN, ZPV8PCLK // No results found for: GLU, GLUCPOC     No results found for: HUS0SCPX, YDE8NPHM      No results found for: VITD3, XQVID2, XQVID3, XQVID, VD3RIA, AMVS28FGLOD     Drug Screen Most Recent Result Date    No resulted procedures found. REVIEW OF SYSTEMS : 1/18/2022  ALL BELOW ARE Negative except : SEE HPI     All other systems reviewed and are negative. 12 point review of systems otherwise negative unless noted in HPI.       RADIOGRAPHS// IMAGING//DIAGNOSTIC DATA     Orders Placed This Encounter    cholecalciferol (VITAMIN D3) (400 Units /10 mcg) tab tablet        I have personally reviewed the images of the above study. The interpretation of this study is my professional opinion            I have spent 20 minutes reviewing the previous notes, reviewing diagnostic studies [Advanced  Imaging, Diagnostic test results (x-rays)] and had a direct face to face with the patient discussing the diagnosis and importance of compliance with the treatment and plan. There is  discussion for the potential for surgery, answering all questions, as well as documenting patient care coordination for this individual on the day of the visit. Disclaimer:     Sections of this note are dictated using utilizing voice recognition software, which may have resulted in some phonetic based errors in grammar and contents. Even though attempts were made to correct all the mistakes, some may have been missed, and remained in the body of the document. If questions arise, please contact our department. An electronic signature was used to authenticate this note. Cali Wilson may have a reminder for a \"due or due soon\" health maintenance. I have asked that she contact her primary care provider for follow-up on this health maintenance. There are no Patient Instructions on file for this visit. Please follow up with your PCP for any health maintenance as recommended.               Juliana Jose, as dictated by, Gavin Das  1/18/2022  7:54 AM

## 2022-01-18 ENCOUNTER — OFFICE VISIT (OUTPATIENT)
Dept: ORTHOPEDIC SURGERY | Age: 57
End: 2022-01-18
Payer: COMMERCIAL

## 2022-01-18 VITALS
TEMPERATURE: 97.3 F | HEIGHT: 64 IN | WEIGHT: 197 LBS | HEART RATE: 78 BPM | BODY MASS INDEX: 33.63 KG/M2 | OXYGEN SATURATION: 100 %

## 2022-01-18 DIAGNOSIS — M25.572 CHRONIC PAIN OF LEFT ANKLE: ICD-10-CM

## 2022-01-18 DIAGNOSIS — M76.822 POSTERIOR TIBIAL TENDINITIS OF LEFT LOWER EXTREMITY: Primary | ICD-10-CM

## 2022-01-18 DIAGNOSIS — G89.29 CHRONIC PAIN OF LEFT ANKLE: ICD-10-CM

## 2022-01-18 DIAGNOSIS — Q79.8 CONGENITAL CONTRACTURE OF RIGHT GASTROCNEMIUS MUSCLE: ICD-10-CM

## 2022-01-18 PROCEDURE — 99213 OFFICE O/P EST LOW 20 MIN: CPT | Performed by: ORTHOPAEDIC SURGERY

## 2022-01-18 RX ORDER — CYANOCOBALAMIN (VITAMIN B-12) 500 MCG
TABLET ORAL
COMMUNITY

## 2022-03-14 NOTE — PROGRESS NOTES
AMBULATORY PROGRESS NOTE      Patient: Elver Villanueva             MRN: [id number]     SSN: [id number] Body mass index is 33.99 kg/m². YOB: 1965     AGE: 61 y.o. EX: female    PCP: Unknown (Inactive)       IMPRESSION //  DIAGNOSIS AND TREATMENT PLAN        Elver Villanueva has a diagnosis of: Follow up as needed (PRN) as: Elver Villanueva is doing well and is asymptomatic on evaluation and per HPI is doing much better. PRN RTO to see me as Elver Villanueva is doing well and having no tenderness on examination 3/15/2022       DIAGNOSES    1. Posterior tibial tendinitis of left lower extremity    2. Congenital contracture of right gastrocnemius muscle        No orders of the defined types were placed in this encounter. PLAN:    1. AVS: Spenco Total Max Arch Support : [employer] $27-45 & New Balance 928s, 850s, Hoka shoes      RTO : PRN    Patient Instructions   New Balance 928s or 850s , [employer]oka Shoes    Spenco Total Max Arch Support : [employer] $27-45            Please follow up with your PCP for any health maintenance as recommended         Elver Villanueva  expresses understanding of the diagnosis, treatment plan, and all of their proposed questions were answered to their satisfaction. Patient education has been provided re the diagnoses. HPI //  Catrachita Aguiar IS A 61 y.o. female who is a/an  established patient, presenting to my outpatient office for evaluation of  the following chief complaint(s):     Chief Complaint   Patient presents with    Foot Pain     left foot       At [address] presented w/ left posterior tibial tendinitis. I advised the patient to continue using the L Custom medially posted SMO brace for an additional 2 months. I advise the patient to gradually wean into Spenco Total Max Arch Supports.  I instruct the patient to bring the Spenco Total Max Arch Supports.        Since LOV  [place] she is doing better. She states she was able to work w/o the [facility] brace. Visit Vitals  Pulse 64   Ht 5' 4\" (1.626 m)   Wt 198 lb (89.8 kg)   SpO2 100%   BMI 33.99 kg/m²       Appearance: Alert, well appearing and pleasant patient who is in no distress, oriented to person, place/time, and who follows commands. Normal dress/motor activity/thought processes/memory. This patient is accompanied in the examination room by her  self. Patient arrives to office via: with assistive device: Smo brace    Psychiatric:  Normal Affect/mood. Judgement, behavior, and conduct are appropriate. Speech normal in context and clarity, memory intact grossly, no involuntary movements - tremors. H EENT (2): Head normocephalic & atraumatic. Eye: pupils are round// EOM are intact // Neck: ROM WNL  // Hearings Intact   Respiratory: Breathing non labored     ANKLE/FOOT left    Gait: uses assistive device smo brace  Tenderness: nontender    Cutaneous:  WNL. Joint Motion:  WNL   Joint / Tendon Stability:  No Ankle or Subtalar instability or joint laxity. No peroneal sublux ability or dislocation  Alignment: neutral Hindfoot,    Neuro Motor/Sensory: NL/NL  Vascular: NL foot/ankle pulses,   Lymphatics: No extremity lymphedema, No calf swelling, no tenderness to calf muscles. CHART REVIEW     Isa Ryder has been experiencing pain and discomfort confirmed as outlined in the pain assessment outlined below.  was reviewed by Bulmaro Mcneil MD on 3/15/2022. Pain Assessment  1/18/2022   Location of Pain Ankle   Location Modifiers Left   Severity of Pain 2   Quality of Pain Aching; Throbbing   Duration of Pain A few minutes   Duration of Pain Comment -   Frequency of Pain Intermittent   Date Pain First Started -   Date Pain First Started Comment -   Aggravating Factors Stretching;Straightening;Exercise;Walking;Standing   Limiting Behavior Yes   Relieving Factors Rest   Relieving Factors Comment voltaren Result of Injury Yes   Work-Related Injury No        Isa Mark  has no past medical history on file. Patients is employed at:          has no past medical history on file. has a past surgical history that includes foot/toes surgery proc unlisted (Right, 08/11/2021); hx shoulder replacement (Right, 2020); pr anesth,surgery of shoulder (Right); and pr anesth,surgery of shoulder (Left). family history is not on file. Current Outpatient Medications   Medication Sig    cholecalciferol (VITAMIN D3) (400 Units /10 mcg) tab tablet Take  by mouth.  diclofenac EC (VOLTAREN) 75 mg EC tablet TAKE 1 TABLET BY MOUTH TWICE DAILY WITH MEALS    ibuprofen (Motrin IB) 200 mg tablet Take  by mouth.  Vyzulta 0.024 % drop      No current facility-administered medications for this visit. Allergies   Allergen Reactions    Keflex [Cephalexin] Swelling    Lisinopril Swelling     Social History     Occupational History    Not on file   Tobacco Use    Smoking status: Never Smoker    Smokeless tobacco: Never Used   Substance and Sexual Activity    Alcohol use: Not on file    Drug use: Not on file    Sexual activity: Not on file       reports that she has never smoked. She has never used smokeless tobacco.      DIAGNOSTIC LAB DATA      No results found for: HBA1C, MWD2KHKJ, KSX7BITX // No results found for: GLU, GLUCPOC     No results found for: HFU9ETJY, XGK7BQKX      No results found for: VITD3, XQVID2, XQVID3, XQVID, VD3RIA, [id number]     Drug Screen Most Recent Result Date    No resulted procedures found. REVIEW OF SYSTEMS : 3/15/2022  ALL BELOW ARE Negative except : SEE HPI     All other systems reviewed and are negative. 12 point review of systems otherwise negative unless noted in HPI. RADIOGRAPHS// IMAGING//DIAGNOSTIC DATA     No orders of the defined types were placed in this encounter. I have personally reviewed the images of the above study.  The interpretation of this study is my professional opinion             I have spent 25 minutes reviewing the previous notes, reviewing diagnostic studies [Advanced  Imaging, Diagnostic test results (x-rays)] and had a direct face to face with the patient discussing the diagnosis and importance of compliance with the treatment and plan. There is  discussion for the potential for surgery, answering all questions, as well as documenting patient care coordination for this individual on the day of the visit. Disclaimer:     Sections of this note are dictated using utilizing voice recognition software, which may have resulted in some phonetic based errors in grammar and contents. Even though attempts were made to correct all the mistakes, some may have been missed, and remained in the body of the document. If questions arise, please contact our department. An electronic signature was used to authenticate this note. Jefry Cronin may have a reminder for a \"due or due soon\" health maintenance. I have asked that she contact her primary care provider for follow-up on this health maintenance. Patient Instructions   New Balance 928s or 850s , Morena Javed[employer]co Total Max Arch Support : [address] [facility] [address] $27-45            Please follow up with your PCP for any health maintenance as recommended.               Gay Zhang, as dictated byFernanda  3/15/2022  1:32 PM

## 2022-03-15 ENCOUNTER — OFFICE VISIT (OUTPATIENT)
Dept: ORTHOPEDIC SURGERY | Age: 57
End: 2022-03-15
Payer: COMMERCIAL

## 2022-03-15 VITALS — WEIGHT: 198 LBS | BODY MASS INDEX: 33.8 KG/M2 | HEART RATE: 64 BPM | HEIGHT: 64 IN | OXYGEN SATURATION: 100 %

## 2022-03-15 DIAGNOSIS — Q79.8 CONGENITAL CONTRACTURE OF RIGHT GASTROCNEMIUS MUSCLE: ICD-10-CM

## 2022-03-15 DIAGNOSIS — M76.822 POSTERIOR TIBIAL TENDINITIS OF LEFT LOWER EXTREMITY: Primary | ICD-10-CM

## 2022-03-15 PROCEDURE — 99213 OFFICE O/P EST LOW 20 MIN: CPT | Performed by: ORTHOPAEDIC SURGERY

## 2022-03-15 NOTE — PATIENT INSTRUCTIONS
New Balance 928s or 850s , Morena Woodson Total Max Arch Support : [address] [facility] [address] $09-27

## 2022-03-22 RX ORDER — DICLOFENAC SODIUM 75 MG/1
TABLET, DELAYED RELEASE ORAL
Qty: 30 TABLET | Refills: 0 | Status: SHIPPED | OUTPATIENT
Start: 2022-03-22

## 2022-10-03 ENCOUNTER — OFFICE VISIT (OUTPATIENT)
Dept: ORTHOPEDIC SURGERY | Age: 57
End: 2022-10-03
Payer: COMMERCIAL

## 2022-10-03 VITALS — OXYGEN SATURATION: 99 % | HEART RATE: 88 BPM | TEMPERATURE: 97.5 F | BODY MASS INDEX: 31.24 KG/M2 | WEIGHT: 182 LBS

## 2022-10-03 DIAGNOSIS — M25.572 ACUTE LEFT ANKLE PAIN: ICD-10-CM

## 2022-10-03 DIAGNOSIS — M25.371 ANKLE INSTABILITY, RIGHT: ICD-10-CM

## 2022-10-03 DIAGNOSIS — M76.62 LEFT ACHILLES TENDINITIS: ICD-10-CM

## 2022-10-03 DIAGNOSIS — M76.72 PERONEAL TENDINITIS OF LEFT LOWER LEG: Primary | ICD-10-CM

## 2022-10-03 PROCEDURE — 99214 OFFICE O/P EST MOD 30 MIN: CPT | Performed by: ORTHOPAEDIC SURGERY

## 2022-10-03 PROCEDURE — 73610 X-RAY EXAM OF ANKLE: CPT | Performed by: ORTHOPAEDIC SURGERY

## 2022-10-03 RX ORDER — LIFITEGRAST 50 MG/ML
SOLUTION/ DROPS OPHTHALMIC
COMMUNITY

## 2022-10-03 NOTE — PROGRESS NOTES
AMBULATORY PROGRESS NOTE      Patient: Kathia Gardner             MRN: [id number]     SSN: [id number] Body mass index is 31.24 kg/m². YOB: 1965     AGE: 61 y.o. EX: female    PCP: Unknown (Inactive)       IMPRESSION //  DIAGNOSIS AND TREATMENT PLAN      Kathia Gardner has a diagnosis of:      She has some right anterior 1+ drawer instability right ankle, has some weakness to plantarflexion, (had a gastroc Jose procedure in the past and for this, does not have any calf pain but she has some difficulty walking on her tippy toes and when she wears high-heeled shoes. I think this is from the some decompensation of having the gastroc procedure on the right side. On the left side no instability no popping or clicking or grinding, discomfort is is along the anterior process of the calcaneus near the CC region, I think this is when she is walking in the plantarflexed position more so when she is using treadmill. We will recommend treadmill 0 degrees inclination no more than 5 degrees    1. Peroneal tendinitis of left lower leg    2. Acute left ankle pain    3. Ankle instability, right    4. Left Achilles tendinitis        Orders Placed This Encounter    AMB POC XRAY, ANKLE; COMPLETE, 3+ VIE     ASK ALL FEMALE PATIENTS IF THEY ARE PREGNANT     Order Specific Question:   Reason for Exam     Answer:   PAIN    REFERRAL TO PHYSICAL THERAPY     Referral Priority:   Routine     Referral Type:   PT/OT/ST     Referral Reason:   Specialty Services Required     Requested Specialty:   Physical Therapy     Number of Visits Requested:   1    lifitegrast (Xiidra) 5 % dpet     Sig: Apply  to eye. PLAN:    1. I obtained left ankle 3V XR in the office today. 2. Modulate HEP as symptoms warrant. 3. Referral to PT for right ankle instability, gastroc weakness. RTO PRN    There are no Patient Instructions on file for this visit.         Please follow up with your PCP for any health maintenance as recommended         Debbrah Mortimer  expresses understanding of the diagnosis, treatment plan, and all of their proposed questions were answered to their satisfaction. Patient education has been provided re the diagnoses. [facility] //  [address] [address] [address] IS A 61 y.o. female who is a/an  established patient, presenting to my outpatient office for evaluation of  the following chief complaint(s):     Chief Complaint   Patient presents with    Foot Pain     Left         At LOV, Debbrah Mortimer presented with left posterior tibial tendinitis. I recommended she try Spenco Total Max Arch Supports and New Balance 928s, 850s, Hoka shoes. Since LOV, Isa Cartagena complains of left ankle pain. She states she joined a gym and notes pain in the left lateral ankle with pushoff when walking on an inclined treadmill (4-8%). She denies any pain with walking on a level treadmill. She notes she wears Spenco Total Max Arch Supports in her usual outdoor shoes. She also notes continued instability in the right foot, ankle, and leg. She denies any pain on the right. She admits that she limps with wearing heels. Of note, Debbrah Mortimer is s/p left ankle surgery and right gastrocnemius Jose release. Visit Vitals  Pulse 88   Temp 97.5 °F (36.4 °C) (Temporal)   Wt 182 lb (82.6 kg)   SpO2 99%   BMI 31.24 kg/m²       Appearance: Alert, well appearing and pleasant patient who is in no distress, oriented to person, place/time, and who follows commands. Normal dress/motor activity/thought processes/memory. This patient is accompanied in the examination room by her  self. Patient arrives to office via: without assistive device. Psychiatric:  Normal Affect/mood. Judgement, behavior, and conduct are appropriate. Speech normal in context and clarity, memory intact grossly, no involuntary movements - tremors. H EENT (2): Head normocephalic & atraumatic.   Eye: pupils are round// EOM are intact // Neck: ROM WNL  // Hearings Intact   Respiratory: Breathing non labored     ANKLE/FOOT bilateral    Gait: normal  Tenderness: Left: mild over the calcaneocuboid joint. Cutaneous: Left: Well-healed surgical scar over the central anterior ankle. Prominent peroneal tubercle. Right: well-healed small surgical incision over the posterior calf. Joint Motion:  WNL   Joint / Tendon Stability:  Left: No Ankle or Subtalar instability or joint laxity. Right: 1+ anterior drawer. Notable laxity. Bilateral: No peroneal sublux ability or dislocation  Alignment: neutral Hindfoot,    Neuro Motor/Sensory: NL/NL  Vascular: NL foot/ankle pulses,   Lymphatics: No extremity lymphedema, No calf swelling, no tenderness to calf muscles. CHART REVIEW     Isa Alva has been experiencing pain and discomfort confirmed as outlined in the pain assessment outlined below.  was reviewed by Carla Marquez MD, on 10/3/2022. Pain Assessment  10/3/2022   Location of Pain Foot   Location Modifiers Left   Severity of Pain -   Quality of Pain Dull   Duration of Pain Persistent   Duration of Pain Comment -   Frequency of Pain Constant   Date Pain First Started -   Date Pain First Started Comment -   Aggravating Factors (No Data)   Aggravating Factors Comment inclines on treadmill   Limiting Behavior Some   Relieving Factors Rest   Relieving Factors Comment -   Result of Injury -   Work-Related Injury -        Isa Cartagena  has no past medical history on file. Patients is employed at:          has no past medical history on file. has a past surgical history that includes foot/toes surgery proc unlisted (Right, 08/11/2021); hx shoulder replacement (Right, 2020); pr anesth,surgery of shoulder (Right); and pr anesth,surgery of shoulder (Left). family history is not on file.    Current Outpatient Medications   Medication Sig    lifitegrast (Xiidra) 5 % dpet Apply  to eye.    diclofenac EC (VOLTAREN) 75 mg EC tablet TAKE 1 TABLET BY MOUTH TWICE DAILY WITH MEALS    cholecalciferol (VITAMIN D3) (400 Units /10 mcg) tab tablet Take  by mouth. ibuprofen (MOTRIN) 200 mg tablet Take  by mouth. Vyzulta 0.024 % drop  (Patient not taking: Reported on 10/3/2022)     No current facility-administered medications for this visit. Allergies   Allergen Reactions    Keflex [Cephalexin] Swelling    Lisinopril Swelling     Social History     Occupational History    Not on file   Tobacco Use    Smoking status: Never    Smokeless tobacco: Never   Substance and Sexual Activity    Alcohol use: Not on file    Drug use: Not on file    Sexual activity: Not on file       reports that she has never smoked. She has never used smokeless tobacco.      DIAGNOSTIC LAB DATA      No results found for: HBA1C, [id number], LYI2HAKN // No results found for: GLU, GLUCPOC     No results found for: TEW0MBFD, UBQ8IYSQ      No results found for: VITD3, XQVID2, XQVID3, XQVID, VD3RIA, [id number]     Drug Screen Most Recent Result Date    No resulted procedures found. REVIEW OF SYSTEMS : 10/3/2022  ALL BELOW ARE Negative except : SEE HPI     All other systems reviewed and are negative. 12 point review of systems otherwise negative unless noted in HPI. RADIOGRAPHS// IMAGING//DIAGNOSTIC DATA     Orders Placed This Encounter    AMB POC XRAY, ANKLE; COMPLETE, 3+ VIE    REFERRAL TO PHYSICAL THERAPY    lifitegrast (Xiidra) 5 % dpet      ANKLE X RAYS 3 VIEWS LEFT  AP/LAT/OBLIQUE  X RAYS AT [facility]  10/3/2022    NON WEIGHT BEARING    X RAYS AT [address] [place] [address]  10/3/2022    Bones: No fractures or dislocations. No focal osteolytic or osteoblastic process   Bone Spurs: No significant bone spurs  Alignment: Ankle mortise alignment is congruent, Tibial plafond and talar dome intact.      No Osteochondral defects seen   Joint: No Significant OA changes present  Soft Tissues: Normal, No radiopaque foreign body            No abnormal calcific densities to soft tissues            No ankle joint effusion in lateral projection. Mineralization: Suggests no Osteopenia    I have personally reviewed the results of the above study. The interpretation of this study is my professional opinion   n           I have spent 30 minutes reviewing the previous notes, reviewing diagnostic studies [Advanced  Imaging, Diagnostic test results (x-rays)] and had a direct face to face with the patient discussing the diagnosis and importance of compliance with the treatment and plan. There is  discussion for the potential for surgery, answering all questions, as well as documenting patient care coordination for this individual on the day of the visit. Disclaimer:     Sections of this note are dictated using utilizing voice recognition software, which may have resulted in some phonetic based errors in grammar and contents. Even though attempts were made to correct all the mistakes, some may have been missed, and remained in the body of the document. If questions arise, please contact our department. An electronic signature was used to authenticate this note. Marcos Conklin may have a reminder for a \"due or due soon\" health maintenance. I have asked that she contact her primary care provider for follow-up on this health maintenance. There are no Patient Instructions on file for this visit. Please follow up with your PCP for any health maintenance as recommended.                 Scribed by Curlee Romberg, as dictated by Jane Vang MD.   10/3/2022  9:34 AM

## 2022-10-10 ENCOUNTER — HOSPITAL ENCOUNTER (OUTPATIENT)
Dept: PHYSICAL THERAPY | Age: 57
Discharge: HOME | End: 2022-10-10
Payer: COMMERCIAL

## 2022-10-10 PROCEDURE — 97162 PT EVAL MOD COMPLEX 30 MIN: CPT

## 2022-10-10 PROCEDURE — 97112 NEUROMUSCULAR REEDUCATION: CPT

## 2022-10-10 PROCEDURE — 97535 SELF CARE MNGMENT TRAINING: CPT

## 2022-10-10 NOTE — PROGRESS NOTES
PHYSICAL THERAPY - DAILY TREATMENT NOTE    Patient Name: Danitza Xiong        Date: 10/10/2022  : 1965   yes Patient  Verified  Visit #:     Insurance: Payor: Fito Beckett / Plan: Greg Xavier Se HMO / Product Type: HMO /      In time: 800 Out time: 840   Total Treatment Time: 40     Medicare/[facility] Time Tracking (below)   Total Timed Codes (min):  na 1:1 Treatment Time:  na     TREATMENT AREA =  Right foot pain [M79.671]    SUBJECTIVE  Pain Level (on 0 to 10 scale):  0  / 10   Medication Changes/New allergies or changes in medical history, any new surgeries or procedures?    no  If yes, update Summary List   Subjective Functional Status/Changes:  []  No changes reported     See POC          OBJECTIVE  ND min Therapeutic Exercise:  [x]  See flow sheet   Rationale:      increase strength and improve coordination to improve the patients ability to perform ADLs. 10 min Neuromuscular Re-ed: [x]  See flow sheet   Rationale:    increase strength, improve coordination, and improve balance to improve the patients ability to walk on uneven surfaces    10 min Self Care: Pt education on HEP, POC, prognosis, and diagnosis. Rationale:    Improve pt understanding to improve the patients ability to progress therapy at home.     Billed With/As:   [] TE   [] TA   [] Neuro   [x] Self Care Patient Education: [x] Review HEP    [] Progressed/Changed HEP based on:   [] positioning   [] body mechanics   [] transfers   [] heat/ice application    [] other:      Other Objective/Functional Measures:    Shown and performed HEP     Post Treatment Pain Level (on 0 to 10) scale:   0  / 10     ASSESSMENT  Assessment/Changes in Function:     See POC     []  See Progress Note/Recertification   Patient will continue to benefit from skilled PT services to modify and progress therapeutic interventions, address functional mobility deficits, analyze and cue movement patterns, and analyze and modify body mechanics/ergonomics to attain remaining goals.    Progress toward goals / Updated goals:    See POC     PLAN  [x]  Upgrade activities as tolerated yes Continue plan of care   []  Discharge due to :    []  Other:      Therapist: Lakesha Georges PT , DPT    Date: 10/10/2022 Time: 7:54 AM     Future Appointments   Date Time Provider Tucker Lara   10/10/2022  8:00 AM Jeffrey Mir PT [facility]

## 2022-10-10 NOTE — THERAPY EVALUATION
[address], [address], [facility] - Phone: [phone]  Fax: [phone] / [facility]  Patient Name: Virginia Winslow : 1965   Medical   Diagnosis: Right foot pain [M79.671] Treatment Diagnosis: R ankle pain   Onset Date: Aug 2021     Referral Source: Tracee Singh MD Start of Care [facility]): 10/10/2022   Prior Hospitalization: See medical history Provider #: [id number]   Prior Level of Function: Chronic weakness and R ankle instability   Comorbidities: Depression, BMI over 30   Medications: Verified on Patient Summary List   The Plan of Care and following information is based on the information from the initial evaluation.   ===========================================================================================  Assessment / key information:  Virginia Winslow is a 61 y.o. female with Dx: R ankle weakness/instability starting in Aug 2021 when she underwent R gastroc release. Notes she was not referred to PT after surgery, but new MD has now prescribed PT. Notes she can't stand on her tip toes and cannot walk in heels without a limp. Negotiating curbs/stairs are also difficult. Notes walking in regular supportive shoes is okay, but she feels like she limps slightly. Denies N/T. Pt would like to be able to walk her dog without a limp, wear heels, and be able to stand on her toes. Wears a brace on the L ankle due to past injury and achilies tendonitis; has arch support for R foot when walking for long periods. Occupation: Donor  - sitting and standing occasionally. Objective: FOTO score = 62 (an established functional score where 100 = no disability). Gait: WNL. Ankle ROM PF R 44 L 36, DF R 26 L 21, EV R 30 L 34, INV R 45 L 46.    Strength: Hip Flexion R 4+/5 L 5/5, ABD R 3+/5 L 4-/5, Extension R 3+/4 L 4/5,   Ankle PF R 1/5 L 3/5, DF R 4+/5 L 5/5, EV R 4/5 L 4+/5, INV R 4+/5 L 5/5. Functional Squat knee angle 120. SLS R 4sec L 6sec; tandem 10sec each with mod sway  Current deficits include decreased strength in R>L ankle leading to decreased stability and difficulty performing ADLs. Pt will benefit from a comprehensive POC/HEP to address impairments and restore function in order to return to prior level of function and prevent secondary impairments.  ===========================================================================================  Eval Complexity: History MEDIUM  Complexity : 1-2 comorbidities / personal factors will impact the outcome/ POC ;  Examination  HIGH Complexity : 4+ Standardized tests and measures addressing body structure, function, activity limitation and / or participation in recreation ; Presentation MEDIUM Complexity : Evolving with changing characteristics ; Decision Making MEDIUM Complexity : FOTO score of 26-74; Overall Complexity MEDIUM  Problem List: pain affecting function, decrease ROM, decrease strength, impaired gait/ balance, decrease ADL/ functional abilitiies, decrease activity tolerance, decrease flexibility/ joint mobility, and decrease transfer abilities   Treatment Plan may include any combination of the following: Therapeutic exercise, Therapeutic activities, Neuromuscular re-education, Physical agent/modality, Gait/balance training, Manual therapy, Aquatic therapy, Patient education, Self Care training, Functional mobility training, Home safety training, and Stair training  Patient / Family readiness to learn indicated by: asking questions, trying to perform skills, and interest  Persons(s) to be included in education: patient (P)  Barriers to Learning/Limitations: None  Measures taken, if barriers to learning:    Patient Goal (s): Strengthening, walk in heels without limp   Patient self reported health status: good  Rehabilitation Potential: good  Short Term Goals:  To be accomplished in  3 weeks:  Independent with HEP to progress to meet goals. 2. Pt to report 25% improvement in symptoms for improvement in ADLs. Long Term Goals: To be accomplished in  6  weeks:  Improve FOTO score to 72/100 to show a significant functional change. 2. Pt to report 75% improvement in symptoms for improvement in QoL. 3. Pt to demonstrate eccentric heel raise on R to progress strength. Frequency / Duration:   Patient to be seen  1-2  times per week for 6  weeks:  Patient / Caregiver education and instruction: self care, activity modification, and exercises  Therapist Signature: Sheryl Hickman PT , DPT Date: 22/75/9531   Certification Period: na Time: 7:56 AM   ===========================================================================================  I certify that the above Physical Therapy Services are being furnished while the patient is under my care. I agree with the treatment plan and certify that this therapy is necessary. Physician Signature:        Date:       Time:                                        Leopoldo Torres MD  Please sign and return to [facility]. or you may fax the signed copy to [phone]. Thank you.

## 2022-10-12 ENCOUNTER — HOSPITAL ENCOUNTER (OUTPATIENT)
Dept: PHYSICAL THERAPY | Age: 57
Discharge: HOME | End: 2022-10-12
Payer: COMMERCIAL

## 2022-10-12 PROCEDURE — 97112 NEUROMUSCULAR REEDUCATION: CPT

## 2022-10-12 PROCEDURE — 97110 THERAPEUTIC EXERCISES: CPT

## 2022-10-12 PROCEDURE — 97140 MANUAL THERAPY 1/> REGIONS: CPT

## 2022-10-12 NOTE — PROGRESS NOTES
PHYSICAL THERAPY - DAILY TREATMENT NOTE    Patient Name: Severo Booze        Date: 10/12/2022  : 1965   yes Patient  Verified  Visit #:     Insurance: Payor: Forrest Elizabeth / Plan: [address] HMO / Product Type: HMO /      In time: 740 Out time: 204   Total Treatment Time: 41     Medicare/[facility] Time Tracking (below)   Total Timed Codes (min):  n/a 1:1 Treatment Time:  n/a     TREATMENT AREA =  Right foot pain [M79.671]    SUBJECTIVE  Pain Level (on 0 to 10 scale):  0  / 10   Medication Changes/New allergies or changes in medical history, any new surgeries or procedures?    no  If yes, update Summary List   Subjective Functional Status/Changes:  []  No changes reported     Patient reports no significant pain in the R ankle/foot, however feels like her gait is unsteady and feels like she's limping. OBJECTIVE  20 min Therapeutic Exercise:  [x]  See flow sheet   Rationale:      increase ROM and increase strength to improve the patients ability to perform standing/walking activities. 10 min Manual Therapy: Biomechanical reassessment; STM to R gastroc; gentle R manual gastroc and DF stretch   Rationale:      increase ROM, increase tissue extensibility, and decrease trigger points to improve patient's ability to normalize gait pattern . The manual therapy interventions were performed at a separate and distinct time from the therapeutic activities interventions. 11 min Neuromuscular Re-ed: [x]  See flow sheet   Rationale:    increase strength, improve coordination, and improve balance to improve the patients ability to facilitate coordinated muscular contration for ease of movement during walking activities.      Billed With/As:   [x] TE   [] TA   [] Neuro   [] Self Care Patient Education: [x] Review HEP:   MedBridge Access Code Date Issued   [email or web address] 10/10/22  With OTB and MTB     [x] Progressed/Changed HEP based on:   [] Addressed barriers and behaviors     [] Therapeutic Neuroscience Pain Education, metaphor, reframing, contexts. [] Sleep Education   [] Body Mechanics [] Healing Timeframe     [] Self STM with ball at \"the spot\"  [] Walking Program/Global Activity   [] other:        Other Objective/Functional Measures:    *initiated PT session with subjective taken followed by manual techniques/biomechanical reassessment then progression/initiation of exercises (see flowsheet for loads, drills, & volumes) to increase R foot/ankle stability/strength  *demo increase ankle/foot strategy with mild trunk sway during balance exercises       Post Treatment Pain Level (on 0 to 10) scale:   0  / 10     ASSESSMENT  Assessment/Changes in Function:     Patient apprehensive with eccentric heel raises on firm ground with bilateral UE assistance due to lack of PF strength. Required max encouragement with exercise, however unable to achieve eccentric HR, therefore performed DL with UE assistance. Will continue to progress exercises per pt's tolerance and POC. []  See Progress Note/Recertification   Patient will continue to benefit from skilled PT services to modify and progress therapeutic interventions, address functional mobility deficits, address ROM deficits, address strength deficits, analyze and cue movement patterns, analyze and modify body mechanics/ergonomics, and instruct in home and community integration to attain remaining goals. Progress toward goals / Updated goals:    Short Term Goals: To be accomplished in  3  weeks: Independent with HEP to progress to meet goals. Initiated 10/10 - issued initial HEP  2. Pt to report 25% improvement in symptoms for improvement in ADLs. Long Term Goals: To be accomplished in  6  weeks:  Improve FOTO score to 72/100 to show a significant functional change. 2. Pt to report 75% improvement in symptoms for improvement in QoL. 3. Pt to demonstrate eccentric heel raise on R to progress strength.        PLAN  [x]  Upgrade activities as tolerated yes Continue plan of care   []  Discharge due to :    []  Other:      Therapist: Temi Range    Date: 10/12/2022 Time: 12:10 PM     Future Appointments   Date Time Provider Tucker Lara   10/12/2022  7:40 AM Nehemias Charlton [facility]   10/19/2022  7:40 AM Nehemias Charlton [facility]   10/26/2022  8:00 AM Damien Enriquez [facility]   11/2/2022  8:00 AM Damien Enriquez [facility]   11/9/2022  7:40 AM [facility]   11/16/2022  7:40 AM [facility]

## 2022-10-19 ENCOUNTER — HOSPITAL ENCOUNTER (OUTPATIENT)
Dept: PHYSICAL THERAPY | Age: 57
Discharge: HOME | End: 2022-10-19
Payer: COMMERCIAL

## 2022-10-19 PROCEDURE — 97110 THERAPEUTIC EXERCISES: CPT

## 2022-10-19 PROCEDURE — 97112 NEUROMUSCULAR REEDUCATION: CPT

## 2022-10-19 PROCEDURE — 97140 MANUAL THERAPY 1/> REGIONS: CPT

## 2022-10-19 NOTE — PROGRESS NOTES
PHYSICAL THERAPY - DAILY TREATMENT NOTE    Patient Name: Claudia Demarco        Date: 10/19/2022  : 1965   yes Patient  Verified  Visit #:   3   of   12  Insurance: Payor: Diandra Brown / Plan: [address] HMO / Product Type: HMO /      In time: 740 Out time: 058   Total Treatment Time: 38     Medicare/[employer] Time Tracking (below)   Total Timed Codes (min):  n/a 1:1 Treatment Time:  n/a     TREATMENT AREA =  Right foot pain [M79.671]    SUBJECTIVE  Pain Level (on 0 to 10 scale):  0  / 10   Medication Changes/New allergies or changes in medical history, any new surgeries or procedures?    no  If yes, update Summary List   Subjective Functional Status/Changes:  []  No changes reported     Patient reports falling yesterday while  walking her dog. States she slipped in the mud with her L foot. Didn't notice pain in the L foot until this morning. No pain in the R foot. Took Tylenol about 1 hour before today's appt. OBJECTIVE  20 min Therapeutic Exercise:  [x]  See flow sheet   Rationale:      increase ROM and increase strength to improve the patients ability to perform standing/walking activities. 8 min Manual Therapy: L foot reassessment. Rationale:      increase ROM, increase tissue extensibility, and decrease trigger points to improve patient's ability to normalize gait pattern . The manual therapy interventions were performed at a separate and distinct time from the therapeutic activities interventions. 10 min Neuromuscular Re-ed: [x]  See flow sheet   Rationale:    increase strength, improve coordination, and improve balance to improve the patients ability to facilitate coordinated muscular contration for ease of movement during walking activities.      Billed With/As:   [x] TE   [] TA   [] Neuro   [] Self Care Patient Education: [x] Review HEP:   MedBridge Access Code Date Issued   [email or web address] 10/10/22  With OTB and MTB     [x] Progressed/Changed HEP based on:   [] Addressed barriers and behaviors     [] Therapeutic Neuroscience Pain Education, metaphor, reframing, contexts. [] Sleep Education   [] Body Mechanics [] Healing Timeframe     [] Self STM with ball at \"the spot\"  [] Walking Program/Global Activity   [] other:        Other Objective/Functional Measures:    *manual observation: increase tautness of L extensor hallucis longus tendon, L longitudinal arch pain with resisted inversion    *modified exercises today due to the L foot injury      Post Treatment Pain Level (on 0 to 10) scale:   0  / 10     ASSESSMENT  Assessment/Changes in Function:     Discussed following up with urgent care if L foot/ankle symptoms continue to persist while weightbearing. Discussed modifying home exercise program to accomodate with L foot symptoms. Pt acknowledged understanding. []  See Progress Note/Recertification   Patient will continue to benefit from skilled PT services to modify and progress therapeutic interventions, address functional mobility deficits, address ROM deficits, address strength deficits, analyze and cue movement patterns, analyze and modify body mechanics/ergonomics, and instruct in home and community integration to attain remaining goals. Progress toward goals / Updated goals:    Short Term Goals: To be accomplished in  3  weeks: Independent with HEP to progress to meet goals. Initiated 10/10 - issued initial HEP  2. Pt to report 25% improvement in symptoms for improvement in ADLs. Long Term Goals: To be accomplished in  6  weeks:  Improve FOTO score to 72/100 to show a significant functional change. 2. Pt to report 75% improvement in symptoms for improvement in QoL. 3. Pt to demonstrate eccentric heel raise on R to progress strength.        PLAN  [x]  Upgrade activities as tolerated yes Continue plan of care   []  Discharge due to :    []  Other:      Therapist: Octavio Sanchez    Date: 10/19/2022 Time: 12:10 PM     Future Appointments   Date Time Provider Department [place]   10/26/2022  8:00 AM Mimi Schmidt, PT [facility]   11/2/2022  8:00 AM Mimi Schmidt, PT [facility]   11/9/2022  7:40 AM [facility]   11/16/2022  7:40 AM [facility]

## 2022-10-25 ENCOUNTER — TELEPHONE (OUTPATIENT)
Dept: PHYSICAL THERAPY | Age: 57
End: 2022-10-25

## 2022-10-26 ENCOUNTER — APPOINTMENT (OUTPATIENT)
Dept: PHYSICAL THERAPY | Age: 57
End: 2022-10-26
Payer: COMMERCIAL

## 2022-10-27 ENCOUNTER — HOSPITAL ENCOUNTER (OUTPATIENT)
Dept: PHYSICAL THERAPY | Age: 57
Discharge: HOME | End: 2022-10-27
Payer: COMMERCIAL

## 2022-10-27 PROCEDURE — 97110 THERAPEUTIC EXERCISES: CPT

## 2022-10-27 PROCEDURE — 97112 NEUROMUSCULAR REEDUCATION: CPT

## 2022-10-27 PROCEDURE — 97140 MANUAL THERAPY 1/> REGIONS: CPT

## 2022-10-27 NOTE — PROGRESS NOTES
PHYSICAL THERAPY - DAILY TREATMENT NOTE    Patient Name: Micki Gardner        Date: 10/27/2022  : 1965   yes Patient  Verified  Visit #:   4   of   12  Insurance: Payor: Farrah Ybarra / Plan: [address] HMO / Product Type: HMO /      In time: 8:46 Out time: 9:30   Total Treatment Time: 44     TREATMENT AREA =  Right foot pain [M79.671]    SUBJECTIVE  Pain Level (on 0 to 10 scale):  0  / 10   Medication Changes/New allergies or changes in medical history, any new surgeries or procedures?    no  If yes, update Summary List   Subjective Functional Status/Changes:  []  No changes reported     Reports the L foot is feeling better. R foot feels the same. OBJECTIVE  24 min Therapeutic Exercise:  [x]  See flow sheet   Rationale:      increase ROM and increase strength to improve the patients ability to perform standing/walking activities. 10 min Manual Therapy: R gastroc, soleus, post tib STM with ankle mobs   Rationale:      increase ROM, increase tissue extensibility, and decrease trigger points to improve patient's ability to normalize gait pattern . The manual therapy interventions were performed at a separate and distinct time from the therapeutic activities interventions. 10 min Neuromuscular Re-ed: [x]  See flow sheet   Rationale:    increase strength, improve coordination, and improve balance to improve the patients ability to facilitate coordinated muscular contration for ease of movement during walking activities. Billed With/As:   [x] TE   [] TA   [] Neuro   [] Self Care Patient Education: [x] Review HEP:   [employer] Access Code Date Issued   [email or web address] 10/10/22  With OTB and MTB     [x] Progressed/Changed HEP based on:   [] Addressed barriers and behaviors     [] Therapeutic Neuroscience Pain Education, metaphor, reframing, contexts.     [] Sleep Education   [] Body Mechanics [] Healing Timeframe     [] Self STM with ball at \"the spot\"  [] Walking Program/Global Activity   [] other: Other Objective/Functional Measures:    See flow sheet for exercises performed. Decreased push off on R with gait     Post Treatment Pain Level (on 0 to 10) scale:   0  / 10     ASSESSMENT  Assessment/Changes in Function:     Continued with therex and balance exercises. Added SL HR with Leg press to lessen weight and strengthen gastroc. Ended with MT to resolve irritation. []  See Progress Note/Recertification   Patient will continue to benefit from skilled PT services to modify and progress therapeutic interventions, address functional mobility deficits, address ROM deficits, address strength deficits, analyze and cue movement patterns, analyze and modify body mechanics/ergonomics, and instruct in home and community integration to attain remaining goals. Progress toward goals / Updated goals:    Short Term Goals: To be accomplished in  3  weeks: Independent with HEP to progress to meet goals. Initiated 10/10 - issued initial HEP  2. Pt to report 25% improvement in symptoms for improvement in ADLs. Long Term Goals: To be accomplished in  6  weeks:  Improve FOTO score to 72/100 to show a significant functional change. 2. Pt to report 75% improvement in symptoms for improvement in QoL. 3. Pt to demonstrate eccentric heel raise on R to progress strength.        PLAN  [x]  Upgrade activities as tolerated yes Continue plan of care   []  Discharge due to :    []  Other:      Therapist: Jessi Barbosa, PT, DPT    Date: 10/27/2022 Time: 12:10 PM     Future Appointments   Date Time Provider Tucker Lara   10/27/2022  8:45 AM Palmer Bowens,  [address] [facility]   11/2/2022  8:00 AM Palmer Bowens  [address] [facility]   11/9/2022  7:40 AM María Pineda [address] [facility]   11/16/2022  7:40 AM María Pineda [address] [facility]   12/12/2022 10:45 AM Nereida Arnett MD [facility]

## 2022-11-09 ENCOUNTER — APPOINTMENT (OUTPATIENT)
Dept: PHYSICAL THERAPY | Age: 57
End: 2022-11-09
Payer: COMMERCIAL

## 2022-11-09 ENCOUNTER — HOSPITAL ENCOUNTER (OUTPATIENT)
Dept: PHYSICAL THERAPY | Age: 57
Discharge: HOME | End: 2022-11-09
Payer: COMMERCIAL

## 2022-11-09 PROCEDURE — 97110 THERAPEUTIC EXERCISES: CPT

## 2022-11-09 PROCEDURE — 97535 SELF CARE MNGMENT TRAINING: CPT

## 2022-11-09 PROCEDURE — 97140 MANUAL THERAPY 1/> REGIONS: CPT

## 2022-11-09 PROCEDURE — 97112 NEUROMUSCULAR REEDUCATION: CPT

## 2022-11-09 NOTE — THERAPY RECERTIFICATION
[facility] PHYSICAL THERAPY  [address] [place] [address] [place]iona [place] [facility] [address],[facility], [facility] - Phone: [phone]  Fax: [phone]  PROGRESS NOTE  Patient Name: Gen Shipley : 1965   Treatment/Medical Diagnosis: Right foot pain [M79.671]   Referral Source: Tonia Barnes MD     Date of Initial Visit: 10/10/22 Attended Visits: 5 Missed Visits: 1     SUMMARY OF TREATMENT  Patient has attended 5 PT sessions, including an initial evaluation for R foot/ankle pain. PT interventions have included manual therapy, therapeutic exercises to increase LE mobility, ROM, strength, and stability, patient education, and HEP. CURRENT STATUS  Patient was making steady progress with PT interventions, however \"slipped and fell in mud\" on 10/18/22 and ended up injuring her L foot. Since the fall, pt reports difficulty performing standing exercises due to lack of strength in R foot/ankle. Therefore pt reports no improvement with PT interventions. Current objective findings: unable to perform standing heel raises, able to perform 3x20 repetitions of weighted seated HR; SLS without shoes: 30 seconds with increase ankle/foot strategy; R ankle strength: DF, PF 4+/5 with knee ext, INV, EV 3+/5; gait analysis: decrease R LE push off and heel strike, increase R midstance time    Goal/Measure of Progress towards SHORT TERM GOALS Goal Met? 1. Independent with HEP to progress to meet goals. Status at Last Eval: Goal established Current Status: Modified HEP due to L foot injury continuing   2.  . Pt to report 25% improvement in symptoms for improvement in ADLs. Status at Last Eval: Goal established Current Status: 0% no       New Goals to be achieved in __4-6__  treatments:  1. FOTO score to 72/100 to show a significant functional change. Status at last PN/RC: 62/100   2. Pt to report 75% improvement in symptoms for improvement in QoL. Status at last PN/RC: 0%   3.  Pt to demonstrate 10 repetitions of eccentric heel raise on R to progress strength. Status at last PN/RC: unable to perform standing DL heel raises         RECOMMENDATIONS  Would benefit from skilled PT for 2x/week for 4-6 treatments to improve R foot/ankle strength stability to normalize gait mechanics and return to PLOF. Patient has also been advised if pt continues to lack progress and/or notes no benefit with PT interventions to return to your office for further assessment of R foot/ankle. Thank you for this referral.     If you have any questions/comments please contact us directly at [phone]. Thank you for allowing us to assist in the care of your patient. Therapist Signature: YARY Marroquin Date: 11/15/22     Time: 3:36 PM   NOTE TO PHYSICIAN:  PLEASE COMPLETE THE ORDERS BELOW AND FAX TO   [facility] Physical Therapy: [phone]  If you are unable to process this request in 24 hours please contact our office: [phone]    ___ I have read the above report and request that my patient continue as recommended.   ___ I have read the above report and request that my patient continue therapy with the following changes/special instructions:_________________________________________________________   ___ I have read the above report and request that my patient be discharged from therapy.      Physician Signature:        Date:       Time:                                 Charisma Roblero MD

## 2022-11-09 NOTE — PROGRESS NOTES
PHYSICAL THERAPY - DAILY TREATMENT NOTE    Patient Name: Mode Sandoval        Date: 2022  : 1965   yes Patient  Verified  Visit #:      of   12  Insurance: Payor: Kristin Mendez / Plan: [address] HMO / Product Type: HMO /      In time: 1212 Out time: 8256   Total Treatment Time: 46     Medicare/[facility] Time Tracking (below)   Total Timed Codes (min):  n/a 1:1 Treatment Time:  n/a     TREATMENT AREA =  Right foot pain [M79.671]    SUBJECTIVE  Pain Level (on 0 to 10 scale):  0  / 10   Medication Changes/New allergies or changes in medical history, any new surgeries or procedures?    no  If yes, update Summary List   Subjective Functional Status/Changes:  []  No changes reported     Pt reports she's had a difficult time with performing standing heel raises since she slipped in the mud on 10/18 and injured her L foot. Denies any falls or red flags since LV. SEE PN       OBJECTIVE  8 min Therapeutic Exercise:  [x]  See flow sheet   Rationale:      increase ROM and increase strength to improve the patients ability to perform standing/walking activities. 8 min Manual Therapy: STM to R gastroc, R peroneals    Rationale:      increase ROM, increase tissue extensibility, and decrease trigger points to improve patient's ability to normalize gait pattern . The manual therapy interventions were performed at a separate and distinct time from the therapeutic activities interventions. 10 min Neuromuscular Re-ed: [x]  See flow sheet   Rationale:    increase strength, improve coordination, and improve balance to improve the patients ability to facilitate coordinated muscular contration for ease of movement during walking activities. 20 min Self Care: POC/biomechanical reassessment. Reviewed HEP. Reviewed current diagnosis, prognosis, and updated POC (patient goals, PT goals, and treatment).      Rationale:  to improve understanding of current diagnosis with realistic expectation of PT to improve compliance/adherence and satisfaction. Billed With/As:   [x] TE   [] TA   [] Neuro   [] Self Care Patient Education: [x] Review HEP:   MedBridge Access Code Date Issued   [id number] 10/10/22  With OTB and MTB     [x] Progressed/Changed HEP based on:   [] Addressed barriers and behaviors     [] Therapeutic Neuroscience Pain Education, metaphor, reframing, contexts. [] Sleep Education   [] Body Mechanics [] Healing Timeframe     [] Self STM with ball at \"the spot\"  [] Walking Program/Global Activity   [] other:        Other Objective/Functional Measures:    SEE PN     Post Treatment Pain Level (on 0 to 10) scale:   0  / 10     ASSESSMENT  Assessment/Changes in Function:     SEE PN     []  See Progress Note/Recertification   Patient will continue to benefit from skilled PT services to modify and progress therapeutic interventions, address functional mobility deficits, address ROM deficits, address strength deficits, analyze and cue movement patterns, analyze and modify body mechanics/ergonomics, and instruct in home and community integration to attain remaining goals.    Progress toward goals / Updated goals:    SEE PN       PLAN  [x]  Upgrade activities as tolerated yes Continue plan of care   []  Discharge due to :    []  Other:      Therapist: Gentry Patton    Date: 11/9/2022 Time: 213 PM     Future Appointments   Date Time Provider Tucker Lara   11/16/2022  7:40 AM Flaco [facility]   12/12/2022 10:45 AM Mayur Arnett MD [facility]

## 2022-11-16 ENCOUNTER — APPOINTMENT (OUTPATIENT)
Dept: PHYSICAL THERAPY | Age: 57
End: 2022-11-16
Payer: COMMERCIAL

## 2022-12-13 ENCOUNTER — OFFICE VISIT (OUTPATIENT)
Dept: ORTHOPEDIC SURGERY | Age: 57
End: 2022-12-13
Payer: COMMERCIAL

## 2022-12-13 VITALS — TEMPERATURE: 97.1 F | BODY MASS INDEX: 30.9 KG/M2 | WEIGHT: 181 LBS | HEIGHT: 64 IN

## 2022-12-13 DIAGNOSIS — M76.62 LEFT ACHILLES TENDINITIS: ICD-10-CM

## 2022-12-13 DIAGNOSIS — M76.61 ACHILLES TENDINITIS OF RIGHT LOWER EXTREMITY: Primary | ICD-10-CM

## 2022-12-13 DIAGNOSIS — M76.822 POSTERIOR TIBIAL TENDINITIS OF LEFT LOWER EXTREMITY: ICD-10-CM

## 2022-12-13 DIAGNOSIS — M76.72 PERONEAL TENDINITIS OF LEFT LOWER LEG: ICD-10-CM

## 2022-12-13 DIAGNOSIS — M25.371 ANKLE INSTABILITY, RIGHT: ICD-10-CM

## 2022-12-13 PROCEDURE — 99213 OFFICE O/P EST LOW 20 MIN: CPT | Performed by: ORTHOPAEDIC SURGERY

## 2022-12-13 NOTE — PROGRESS NOTES
AMBULATORY PROGRESS NOTE      Patient: Debbrah Mortimer             MRN: [id number]     SSN: [id number] Body mass index is 31.07 kg/m². YOB: 1965     AGE: 61 y.o. EX: female    PCP: Amita Moffett MD       IMPRESSION //  DIAGNOSIS AND TREATMENT PLAN      Debbrah Mortimer has a diagnosis of:        DOING BETTER    DIAGNOSES    1. Achilles tendinitis of right lower extremity    2. Posterior tibial tendinitis of left lower extremity    3. Left Achilles tendinitis    4. Ankle instability, right    5. Peroneal tendinitis of left lower leg        No orders of the defined types were placed in this encounter. PLAN:    1. Continue home exercises    RTO PRN    There are no Patient Instructions on file for this visit. Please follow up with your PCP for any health maintenance as recommended. Debbrah Mortimer  expresses understanding of the diagnosis, treatment plan, and all of their proposed questions were answered to their satisfaction. Patient education has been provided re the diagnoses. [facility] //  [address] [address] IS A 61 y.o. female who is a/an  established patient, presenting to my outpatient office for evaluation of  the following chief complaint(s):     No chief complaint on file. At [address] [place] [address], Debbrah Mortimer presented with right ankle instability and left Achilles and peroneal tendinitis    Since LOV, Isa Cartagena reports some slow improvement in her right ankle stability but continued left posterior and medial ankle pain. She has completed physical therapy and has a Theraband at home. Visit Vitals  Temp 97.1 °F (36.2 °C)   Ht 5' 4\" (1.626 m)   Wt 181 lb (82.1 kg)   BMI 31.07 kg/m²       Appearance: Alert, well appearing and pleasant patient who is in no distress, oriented to person, place/time, and who follows commands. Normal dress/motor activity/thought processes/memory.  This patient is accompanied in the examination room by her  self. Patient arrives to office via: without assistive device. Footwear: athletic shoes    Psychiatric:  Normal Affect/mood. Judgement, behavior, and conduct are appropriate. Speech normal in context and clarity, memory intact grossly, no involuntary movements - tremors. H EENT (2): Head normocephalic & atraumatic. Eye: pupils are round// EOM are intact // Neck: ROM WNL  // Hearings Intact   Respiratory: Breathing non labored     ANKLE/FOOT bilateral     Gait: normal  Tenderness: none  Cutaneous:  WNL bilaterally. Joint Motion: Left: Medial ankle pain with eversion. Right: ankle and hindfoot motion WNL. Joint / Tendon Stability:  No Ankle or Subtalar instability or joint laxity. No peroneal sublux ability or dislocation  Alignment: neutral Hindfoot. Mild bunions  bilaterally  Neuro Motor/Sensory: NL/NL  Vascular: NL foot/ankle pulses,   Lymphatics: No extremity lymphedema, No calf swelling, no tenderness to calf muscles. CHART REVIEW     Isa Haywood has been experiencing pain and discomfort confirmed as outlined in the pain assessment outlined below.  was reviewed by Grant Lima MD, on 12/13/2022. Pain Assessment  10/3/2022   Location of Pain Foot   Location Modifiers Left   Severity of Pain -   Quality of Pain Dull   Duration of Pain Persistent   Duration of Pain Comment -   Frequency of Pain Constant   Date Pain First Started -   Date Pain First Started Comment -   Aggravating Factors (No Data)   Aggravating Factors Comment inclines on treadmill   Limiting Behavior Some   Relieving Factors Rest   Relieving Factors Comment -   Result of Injury -   Work-Related Injury -        Isa Cartagena  has no past medical history on file. Patients is employed at:          has no past medical history on file.     has a past surgical history that includes foot/toes surgery proc unlisted (Right, 08/11/2021); hx shoulder replacement (Right, 2020); pr anesth,surgery of shoulder (Right); and pr anesth,surgery of shoulder (Left). family history is not on file. Current Outpatient Medications   Medication Sig    lifitegrast (Xiidra) 5 % dpet Apply  to eye.    diclofenac EC (VOLTAREN) 75 mg EC tablet TAKE 1 TABLET BY MOUTH TWICE DAILY WITH MEALS    cholecalciferol (VITAMIN D3) (400 Units /10 mcg) tab tablet Take  by mouth. ibuprofen (MOTRIN) 200 mg tablet Take  by mouth. Vyzulta 0.024 % drop  (Patient not taking: Reported on 10/3/2022)     No current facility-administered medications for this visit. Allergies   Allergen Reactions    Keflex [Cephalexin] Swelling    Lisinopril Swelling     Social History     Occupational History    Not on file   Tobacco Use    Smoking status: Never    Smokeless tobacco: Never   Substance and Sexual Activity    Alcohol use: Not on file    Drug use: Not on file    Sexual activity: Not on file       reports that she has never smoked. She has never used smokeless tobacco.      DIAGNOSTIC LAB DATA      No results found for: HBA1C, ZSO8ZXGP, QFP2WPYJ // No results found for: GLU, GLUCPOC     No results found for: JZL2CCLP, WUR2PHVV      No results found for: VITD3, XQVID2, XQVID3, XQVID, VD3RIA, FRBV34RFTQK     Drug Screen Most Recent Result Date    No resulted procedures found. REVIEW OF SYSTEMS : 12/13/2022  ALL BELOW ARE Negative except : SEE HPI     All other systems reviewed and are negative. 12 point review of systems otherwise negative unless noted in HPI. RADIOGRAPHS// IMAGING//DIAGNOSTIC DATA     No orders of the defined types were placed in this encounter. I have personally reviewed the images of the above study.  The interpretation of this study is my professional opinion           I have spent 20 minutes reviewing the previous notes, reviewing diagnostic studies [Advanced  Imaging, Diagnostic test results (x-rays)] and had a direct face to face with the patient discussing the diagnosis and importance of compliance with the treatment and plan. The treatment plan is listed in the above plan section of this Office encounter. I  answered all of her questions, as well as documenting patient care coordination for this individual on the day of the visit. Disclaimer:     Sections of this note are dictated using utilizing voice recognition software, which may have resulted in some phonetic based errors in grammar and contents. Even though attempts were made to correct all the mistakes, some may have been missed, and remained in the body of the document. If questions arise, please contact our department. An electronic signature was used to authenticate this note. Hamilton Lombardo may have a reminder for a \"due or due soon\" health maintenance. I have asked that she contact her primary care provider for follow-up on this health maintenance.            Scribed by Dipika Saleem, as dictated by Moises Plata MD.   12/13/2022  8:45 AM

## 2022-12-27 NOTE — THERAPY DISCHARGE
[address] [place] [address] PHYSICAL THERAPY  [address] [place] [address] 201,[facility], [facility] - Phone: [phone]  Fax: [phone]  DISCHARGE NOTE  Patient Name: Florinda Poon : 1965   Treatment/Medical Diagnosis: Right foot pain [M79.671]   Referral Source: Sumi Gilmore MD     Date of Initial Visit: 10/10/22 Attended Visits: 5 Missed Visits: 1     SUMMARY OF TREATMENT  Patient has attended 5 PT sessions, including an initial evaluation for R foot/ankle pain. PT interventions have included manual therapy, therapeutic exercises to increase LE mobility, ROM, strength, and stability, patient education, and HEP. CURRENT STATUS  Pt was not seen after last PN on 22. They will be DC at this time due to not returning. Below are comments and goals from last PN. Patient was making steady progress with PT interventions, however \"slipped and fell in mud\" on 10/18/22 and ended up injuring her L foot. Since the fall, pt reports difficulty performing standing exercises due to lack of strength in R foot/ankle. Therefore pt reports no improvement with PT interventions. Current objective findings: unable to perform standing heel raises, able to perform 3x20 repetitions of weighted seated HR; SLS without shoes: 30 seconds with increase ankle/foot strategy; R ankle strength: DF, PF 4+/5 with knee ext, INV, EV 3+/5; gait analysis: decrease R LE push off and heel strike, increase R midstance time    Goal/Measure of Progress towards SHORT TERM GOALS Goal Met? 1. Independent with HEP to progress to meet goals. Status at Last Eval: Goal established Current Status: Modified HEP due to L foot injury continuing   2.  . Pt to report 25% improvement in symptoms for improvement in ADLs. Status at Last Eval: Goal established Current Status: 0% no       RECOMMENDATIONS  DC due to pt not returning. If you have any questions/comments please contact us directly at [phone]. Thank you for allowing us to assist in the care of your patient. Therapist Signature: Jessi Barbosa PT Date: 12/27/22     Time: 3:36 PM   NOTE TO PHYSICIAN:  PLEASE COMPLETE THE ORDERS BELOW AND FAX TO   [facility]: [phone]  If you are unable to process this request in 24 hours please contact our office: [phone]    ___ I have read the above report and request that my patient continue as recommended.   ___ I have read the above report and request that my patient continue therapy with the following changes/special instructions:_________________________________________________________   ___ I have read the above report and request that my patient be discharged from therapy.      Physician Signature:        Date:       Time:                                 Doris Hudson MD

## 2023-11-28 ENCOUNTER — HOSPITAL ENCOUNTER (OUTPATIENT)
Facility: HOSPITAL | Age: 58
Setting detail: RECURRING SERIES
Discharge: HOME | End: 2023-12-01
Payer: COMMERCIAL

## 2023-11-28 PROCEDURE — 97161 PT EVAL LOW COMPLEX 20 MIN: CPT

## 2023-11-28 NOTE — PROGRESS NOTES
PHYSICAL / OCCUPATIONAL THERAPY - DAILY TREATMENT NOTE (updated )  For Eval visit    Patient Name: Salo Pollard    Date: 2023    : 1965  Insurance: Payor: Otilio Watson / Plan: DIANA GILLESPIE / Product Type: *No Product type* /      Patient  verified yes     Visit #   Current / Total 1 16   Time   In / Out 8:22 9:00   Pain   In / Out -2/10 1-2/10   Subjective Functional Status/Changes: See POC     TREATMENT AREA =  Left leg pain [M79.605]    OBJECTIVE           33 min   Eval - untimed                      Therapeutic Procedures: Tx Min Billable or 1:1 Min (if diff from Tx Min) Procedure, Rationale, Specifics   5 NC  [address] Self Care/Home Management (timed):  improve patient knowledge and understanding of pain reducing techniques, positioning, posture/ergonomics, home safety, activity modification, diagnosis/prognosis, and physical therapy expectations, procedures and progression  to improve patient's ability to progress to PLOF and address remaining functional goals. (see flow sheet as applicable)     Details if applicable:    Reviewed diagnosis, prognosis, therapy progression   Reviewed and educated on HEP           Details if applicable:      []   assessing strength/ROM  []   assessing activity performance (ex: sit to stand, stair negotiation)  []   assessing balance/control (ex. Ma, DGI, SLS)          Details if applicable:      []   assessing strength/ROM  []   assessing activity performance (ex: sit to stand, stair negotiation)  []   assessing balance/control (ex. Ma, DGI, SLS)          Details if applicable:      []   assessing strength/ROM  []   assessing activity performance (ex: sit to stand, stair negotiation)  []   assessing balance/control (ex.  Ma, DGI, SLS)          Details if applicable:       5   BC Totals Reminder: bill using total billable min of TIMED therapeutic procedures (example: do not include dry needle or estim unattended, both untimed codes, in totals to left)  8-22 min

## 2023-11-28 NOTE — THERAPY EVALUATION
[facility] PHYSICAL THERAPY  [address] [place] [address],[address] Floor, [facility] InstituteEduardo bonillaL[facility] MQ:[phone] Fx: [phone]  Plan of Care / Statement of Necessity for Physical Therapy Services     Patient Name: Lesley Tai : 1965   Medical   Diagnosis:  Left leg pain [M79.605] Treatment Diagnosis: M79.605  Pain in left leg    Onset Date: 2023     Referral Source: Irma Cramer MD Start of Care [facility]): 2023   Prior Hospitalization: See medical history Provider #: [id number]   Prior Level of Function: IND all ADLs and exercise   Comorbidities: R shoulder RCR in  and , L shoulder RCR in , R gastroc release   Arthritis, anxiety or panic disorders, depression, visual impairment     Assessment / key information:  Lesley Tai is a 60 y.o. female who presents to skilled PT for the treatment diagnosis of L hamstring pain. ZAK was walking on unbalanced treadmill setting in 2023. Denies any pop. Feels the pain right on the sit bone area of the L hip. When stretching with the legs straight she can feel it. Trouble for sitting long periods of time. Cannot sit >1 hour. Tried using a padded cushion to sit on and it does not help much. Denies pain walking. Endorses some pain with stairs going up. The pain has been staying about the same since the injury. Tried updating treadmill and that did not help. Denies sharp pain or burning. For exercises she typically walks, runs, and strength trains. Still walks and runs some but just deals with it.      Pain:   Current: 1-2/10     Worst: 8-9/10    Best: 1-2/10      Objective:    ROM/Strength        AROM                     PROM        Strength (1-5)  Hip Left Right Left Right Left Right   Flexion 115   WNL      Extension         Abduction   WNL      Adduction   WNL      ER   65       IR   20       Knee Left Right Left Right Left Right   Extension     5/5 5/5   Flexion     4-/5 p! 5/5   Ankle          DF 15 19 WNL WNL

## 2023-12-05 ENCOUNTER — HOSPITAL ENCOUNTER (OUTPATIENT)
Facility: HOSPITAL | Age: 58
Setting detail: RECURRING SERIES
Discharge: HOME | End: 2023-12-08
Payer: COMMERCIAL

## 2023-12-05 PROCEDURE — 97140 MANUAL THERAPY 1/> REGIONS: CPT

## 2023-12-05 PROCEDURE — 97530 THERAPEUTIC ACTIVITIES: CPT

## 2023-12-05 PROCEDURE — 97110 THERAPEUTIC EXERCISES: CPT

## 2023-12-05 NOTE — PROGRESS NOTES
PHYSICAL / OCCUPATIONAL THERAPY - DAILY TREATMENT NOTE (updated )  For Eval visit    Patient Name: Sharon Pruitt    Date: 2023    : 1965  Insurance: Payor: Darrell Miranda / Plan: DIANA GILLESPIE / Product Type: *No Product type* /      Patient  verified yes     Visit #   Current / Total 2 16   Time   In / Out 7:00 7:50   Pain   In / Out -2/10 1/10   Subjective Functional Status/Changes: Pt reports her pain of the L leg is not too bad right now. Especially in prolonged sitting ( 1 hour) requiring her to stand for relief. She is returning to running 20 minutes everyday with mild pain 3-4/10. She reports  good compliance with HEP. TREATMENT AREA =  Left leg pain [M79.605]    OBJECTIVE    Modalities Rationale:     decrease edema, decrease inflammation, and decrease pain to improve patient's ability to progress to PLOF and address remaining functional goals. min [] Estim Unattended, type/location:                                      []  w/ice    []  w/heat    min [] Estim Attended, type/location:                                     []  w/US     []  w/ice    []  w/heat    []  TENS insruct      min []  Mechanical Traction: type/lbs                   []  pro   []  sup   []  int   []  cont    []  before manual    []  after manual    min []  Ultrasound, settings/location:     10 min  unbill [x]  Ice     []  Heat    location/position: L hip, Pt positioned in long sitting with bolster    min []  Paraffin,  details:     min []  Vasopneumatic Device, press/temp:     min []  Velvet Cross / Graemeae Aditi: If using vaso (only need to measure limb vaso being performed on)      pre-treatment girth :       post-treatment girth :       measured at (landmark location) :      min []  Other:    Skin assessment post-treatment:   Intact         Therapeutic Procedures:   Tx Min Billable or 1:1 Min (if diff from Tx Min) Procedure, Rationale, Specifics     04092 Self Care/Home Management (timed):  improve patient knowledge and

## 2023-12-07 ENCOUNTER — HOSPITAL ENCOUNTER (OUTPATIENT)
Facility: HOSPITAL | Age: 58
Setting detail: RECURRING SERIES
Discharge: HOME | End: 2023-12-10
Payer: COMMERCIAL

## 2023-12-07 PROCEDURE — 97140 MANUAL THERAPY 1/> REGIONS: CPT

## 2023-12-07 PROCEDURE — 97530 THERAPEUTIC ACTIVITIES: CPT

## 2023-12-07 PROCEDURE — 97110 THERAPEUTIC EXERCISES: CPT

## 2023-12-07 NOTE — PROGRESS NOTES
applicable:       36  [facility] Totals Reminder: bill using total billable min of TIMED therapeutic procedures (example: do not include dry needle or estim unattended, both untimed codes, in totals to left)  8-22 min = 1 unit; 23-37 min = 2 units; 38-52 min = 3 units; 53-67 min = 4 units; 68-82 min = 5 units   Total Total     [x]  Patient Education billed concurrently with other procedures   [x] Review HEP    [x] Progressed/Changed HEP, detail:    Access Code: [email or web address]  URL: [email or web address]  Date: 11/28/2023  Prepared by: Colt Eldridge    Exercises  - Hooklying Single Knee to Chest Stretch  - 1-2 x daily - 7 x weekly - 10 reps - 5\" hold  - Hooklying Active Hamstring Stretch  - 1-2 x daily - 7 x weekly - 10 reps - 2-3\" hold  - Supine Hamstring Stretch with Strap  - 1-2 x daily - 7 x weekly - 2 sets - 20-30\" hold  - Seated Hamstring Set  - 1-3 x daily - 7 x weekly - 5 reps - 15-45\" hold  - Supine Bridge  - 1 x daily - 7 x weekly - 3 sets - 10 reps  - Seated Calf Raise with Weights on Thighs  - 1 x daily - 3-4 x weekly - 3 sets - 10-15 reps    [] Other detail:         Objective Information/Functional Measures/Assessment    Pt presents to therapy with no new complaints or excessive muscle soreness from last session. Pt expresses pain relief with DTM of L hamstring attachment at ischial tuberosity. Pt continues to progress with glute and hamstring strengthening exercises to improve muscle performance. Pt performed RDLs with good biomechanics throughout with minimal verbal cues. Pt completed all exercises with no excessive aggravation of pain. After treatment, states muscle soreness of L hip but not sharp pain.     Patient will continue to benefit from skilled PT / OT services to modify and progress therapeutic interventions, analyze and address functional mobility deficits, analyze and address ROM deficits, analyze and address strength deficits, analyze and address soft tissue restrictions,

## 2023-12-12 ENCOUNTER — APPOINTMENT (OUTPATIENT)
Facility: HOSPITAL | Age: 58
End: 2023-12-12
Payer: COMMERCIAL

## 2023-12-21 ENCOUNTER — HOSPITAL ENCOUNTER (OUTPATIENT)
Facility: HOSPITAL | Age: 58
Setting detail: RECURRING SERIES
End: 2023-12-21
Payer: COMMERCIAL

## 2024-01-04 ENCOUNTER — TELEPHONE (OUTPATIENT)
Facility: HOSPITAL | Age: 59
End: 2024-01-04

## 2024-01-04 NOTE — TELEPHONE ENCOUNTER
LVM for patient Re: insurance deductible and cost per visit estimate. Instructed patient to call if there are any concerns or questions. - VICTOR MANUEL

## 2024-01-11 ENCOUNTER — HOSPITAL ENCOUNTER (OUTPATIENT)
Facility: HOSPITAL | Age: 59
Setting detail: RECURRING SERIES
Discharge: HOME | End: 2024-01-14

## 2024-11-18 ENCOUNTER — HOSPITAL ENCOUNTER (OUTPATIENT)
Facility: HOSPITAL | Age: 59
Setting detail: RECURRING SERIES
Discharge: HOME | End: 2024-11-21
Payer: COMMERCIAL

## 2024-11-18 PROCEDURE — 97162 PT EVAL MOD COMPLEX 30 MIN: CPT

## 2024-11-18 PROCEDURE — 97535 SELF CARE MNGMENT TRAINING: CPT

## 2024-11-18 PROCEDURE — 97110 THERAPEUTIC EXERCISES: CPT

## 2024-11-18 NOTE — THERAPY EVALUATION
[facility]  [address], [address], [place] [address] Ph:[phone] Fx: [phone]  Plan of Care / Statement of Necessity for Physical Therapy Services     Patient Name: Jaclyn Lindo : 1965   Medical   Diagnosis: Right shoulder pain [M25.511]  Treatment Diagnosis:  M25.511  RIGHT SHOULDER PAIN    Onset Date: 10/28/24     Referral Source: Toni Medina MD Start of Care (SOC): 2024   Prior Hospitalization: See medical history Provider #: [id number]   Prior Level of Function: Active, running, roller skating, biking   Comorbidities:  Musculoskeletal disorders and Other: Depression, HBP   Subjective: Pt states she tends to do too much. She had Bankhart repair 10/28/24 after a fall. Says had previous dislocations in R shoulder since 2019 and previous Bankhart repair in . Says sling is cumbersome and only wears it out of the house. Reports pain with moving certain ways and too far. No pain at rest. Denies trouble sleeping or numbness/tingling. Says not taking medication but did take motrin the other day after over doing it. Currently not working due to surgery. 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 Plans to go back to work first week of December. Pt wants to get back to running on treadmill for weight loss, has fear of falling and re-injuring shoulder. Says overuses L shoulder due to injuries to R.    Assessment / key information:  Patient is a 58 y/o female presenting to physical therapy s/p R Bankhart repair on 10/28/24. Pt shows pain, decreased ROM, strength, and mobility affecting ability to work, run, and perform household chores and ADLs. Pt has low levels of pain at rest and with passive and active assisted ROM. She arrives without sling, and importance of protection was discussed. Pt will be discharged from sling in 1 week per pt

## 2024-11-18 NOTE — PROGRESS NOTES
PHYSICAL THERAPY - DAILY TREATMENT NOTE    Patient Name: Jaclyn Lindo    Date: 2024    : 1965  Insurance: Payor: DIANA / Plan: DIANA RPN / Product Type: *No Product type* /      Patient  verified YES   Visit #   Current / Total 1 16   Time   In / Out 8:20 9:00   Pain   In / Out 3 3   Subjective Functional Status/Changes: SEE EVALUATION     TREATMENT AREA = Right shoulder pain [M25.511]    OBJECTIVE      24 min   Eval - untimed                      Therapeutic Procedures:  Tx Min Billable or 1:1 Min (if diff from Tx Min) Procedure, Rationale, Specifics   8  65776 Self Care/Home Management (timed):  improve patient knowledge and understanding of home injury/symptom/pain management, positioning, and activity modification  to improve patient's ability to progress to PLOF and address remaining functional goals.  (see flow sheet as applicable)     Details if applicable:  Sling compliance     8  81062 Therapeutic Exercise (timed):  increase ROM, strength, coordination, balance, and proprioception to improve patient's ability to progress to PLOF and address remaining functional goals. (see flow sheet as applicable)     Details if applicable:            Details if applicable:     16 [facility] Totals Reminder: bill using total billable min of TIMED therapeutic procedures (example: do not include dry needle or estim unattended, both untimed codes, in totals to left)  8-22 min = 1 unit; 23-37 min = 2 units; 38-52 min = 3 units; 53-67 min = 4 units; 68-82 min = 5 units   Total Total     [x]  Patient Education billed concurrently with other procedures   [x] Review HEP    [] Progressed/Changed HEP, detail:    [] Other detail:       Objective Information/Functional Measures/Assessment    SEE EVALUATION    Patient will benefit from skilled PT services to  modify and progress therapeutic interventions, analyze and address functional mobility deficits, analyze and address ROM deficits, analyze and address strength

## 2024-11-20 ENCOUNTER — HOSPITAL ENCOUNTER (OUTPATIENT)
Facility: HOSPITAL | Age: 59
Setting detail: RECURRING SERIES
Discharge: HOME | End: 2024-11-23
Payer: COMMERCIAL

## 2024-11-20 PROCEDURE — 97140 MANUAL THERAPY 1/> REGIONS: CPT

## 2024-11-20 PROCEDURE — 97530 THERAPEUTIC ACTIVITIES: CPT

## 2024-11-20 PROCEDURE — 97535 SELF CARE MNGMENT TRAINING: CPT

## 2024-11-20 NOTE — PROGRESS NOTES
PHYSICAL / OCCUPATIONAL THERAPY - DAILY TREATMENT NOTE (updated )    Patient Name: Jaclyn Lindo    Date: 2024    : 1965  Insurance: Payor: DIANA / Plan: DIANA RPN / Product Type: *No Product type* /      Patient  verified Yes     Visit #   Current / Total 2 16   Time   In / Out 900 1000   Pain   In / Out 1-2/10 0   Subjective Functional Status/Changes: Patient notes sleep is getting better.  Says that she was sore post last session and with doing the HEP   Changes to:  Meds, Allergies, Med Hx, Sx Hx?  If yes, update Summary List no       TREATMENT AREA =  Right shoulder pain [M25.511]    OBJECTIVE    Modalities Rationale:     decrease inflammation and decrease pain to improve patient's ability to progress to PLOF and address remaining functional goals.     min [] Estim Unattended, type/location:                                      []  w/ice    []  w/heat    min [] Estim Attended, type/location:                                     []  w/US     []  w/ice    []  w/heat    []  TENS insruct      min []  Mechanical Traction: type/lbs                   []  pro   []  sup   []  int   []  cont    []  before manual    []  after manual    min []  Ultrasound, settings/location:     10 min  unbill [x]  Ice     []  Heat    location/position: Head Elevated ice to R shoulder    min []  Paraffin,  details:     min []  Vasopneumatic Device, press/temp:     min []  Whirlpool / Fluido:    If using vaso (only need to measure limb vaso being performed on)      pre-treatment girth :       post-treatment girth :       measured at (landmark location) :      min []  Other:    Skin assessment post-treatment:   Intact      Therapeutic Procedures:    Tx Min Billable or 1:1 Min (if diff from Tx Min) Procedure, Rationale, Specifics     90615 Therapeutic Exercise (timed):  increase ROM, strength, coordination, balance, and proprioception to improve patient's ability to progress to PLOF and address remaining functional

## 2024-11-25 ENCOUNTER — HOSPITAL ENCOUNTER (OUTPATIENT)
Facility: HOSPITAL | Age: 59
Setting detail: RECURRING SERIES
Discharge: HOME | End: 2024-11-28
Payer: COMMERCIAL

## 2024-11-25 PROCEDURE — 97110 THERAPEUTIC EXERCISES: CPT

## 2024-11-25 PROCEDURE — 97112 NEUROMUSCULAR REEDUCATION: CPT

## 2024-11-25 PROCEDURE — 97140 MANUAL THERAPY 1/> REGIONS: CPT

## 2024-11-25 NOTE — PROGRESS NOTES
Details if applicable:       12  34860 Neuromuscular Re-Education (timed):  improve balance, coordination, kinesthetic sense, posture, core stability and proprioception to improve patient's ability to develop conscious control of individual muscles and awareness of position of extremities in order to progress to PLOF and address remaining functional goals. (see flow sheet as applicable)     Details if applicable:     8  44736 Manual Therapy (timed):  decrease pain, increase ROM, increase tissue extensibility, decrease edema, correct positional vertigo, decrease trigger points, and increase postural awareness to improve patient's ability to progress to PLOF and address remaining functional goals.  The manual therapy interventions were performed at a separate and distinct time from the therapeutic activities interventions . (see flow sheet as applicable)     Details if applicable:  PROM all planes of motion. STM: bilat UT     04344 Therapeutic Activity (timed):  use of dynamic activities replicating functional movements to increase ROM, strength, coordination, balance, and proprioception in order to improve patient's ability to progress to PLOF and address remaining functional goals.  (see flow sheet as applicable)     Details if applicable:       17517 Self Care/Home Management (timed):  improve patient knowledge and understanding of pain reducing techniques, positioning, posture/ergonomics, home safety, activity modification, diagnosis/prognosis, and physical therapy expectations, procedures and progression  to improve patient's ability to progress to PLOF and address remaining functional goals.  (see flow sheet as applicable)     Details if applicable:     33  [facility] Totals Reminder: bill using total billable min of TIMED therapeutic procedures (example: do not include dry needle or estim unattended, both untimed codes, in totals to left)  8-22 min = 1 unit; 23-37 min = 2 units; 38-52 min = 3 units; 53-67 min =

## 2024-11-27 ENCOUNTER — HOSPITAL ENCOUNTER (OUTPATIENT)
Facility: HOSPITAL | Age: 59
Setting detail: RECURRING SERIES
Discharge: HOME | End: 2024-11-30
Payer: COMMERCIAL

## 2024-11-27 PROCEDURE — 97140 MANUAL THERAPY 1/> REGIONS: CPT

## 2024-11-27 PROCEDURE — 97112 NEUROMUSCULAR REEDUCATION: CPT

## 2024-11-27 PROCEDURE — 97110 THERAPEUTIC EXERCISES: CPT

## 2024-11-27 NOTE — PROGRESS NOTES
PHYSICAL / OCCUPATIONAL THERAPY - DAILY TREATMENT NOTE (updated )    Patient Name: Jaclyn Lindo    Date: 2024    : 1965  Insurance: Payor: DIANA / Plan: DIANA RPN / Product Type: *No Product type* /      Patient  verified YES   Visit #   Current / Total 4 16   Time   In / Out 9:40 10:25   Pain   In / Out 2-3 2-3   Subjective Functional Status/Changes: Says HEP is going good but she fatigues very fast.      TREATMENT AREA =  Right shoulder pain [M25.511]    OBJECTIVE    Modalities Rationale:     decrease inflammation and decrease pain to improve patient's ability to progress to PLOF and address remaining functional goals.     min [] Estim Unattended, type/location:                                      []  w/ice    []  w/heat    min [] Estim Attended, type/location:                                     []  w/US     []  w/ice    []  w/heat    []  TENS insruct      min []  Mechanical Traction: type/lbs                   []  pro   []  sup   []  int   []  cont    []  before manual    []  after manual    min []  Ultrasound, settings/location:     10 min  unbill [x]  Ice     []  Heat    location/position: Head Elevated ice to R shoulder    min []  Paraffin,  details:     min []  Vasopneumatic Device, press/temp:     min []  Whirlpool / Fluido:    If using vaso (only need to measure limb vaso being performed on)      pre-treatment girth :       post-treatment girth :       measured at (landmark location) :      min []  Other:    Skin assessment post-treatment:   Intact      Therapeutic Procedures:    Tx Min Billable or 1:1 Min (if diff from Tx Min) Procedure, Rationale, Specifics   15  05649 Therapeutic Exercise (timed):  increase ROM, strength, coordination, balance, and proprioception to improve patient's ability to progress to PLOF and address remaining functional goals. (see flow sheet as applicable)     Details if applicable:       12  44877 Neuromuscular Re-Education (timed):  improve balance,

## 2024-12-02 NOTE — PROGRESS NOTES
ABD: 150 deg, CONCEPCION: C7, FIR: T12 towards ADL    PLAN  - Continue Plan of Care  - Upgrade activities as tolerated    Dottie Hickey PTA    12/3/2024    11:16 AM  If an interpreting service was utilized for treatment of this patient, the contents of this document represent the material reviewed with the patient via the .     Future Appointments   Date Time Provider Department Center   12/6/2024  7:00 AM Marquez Tinsley, PT [facility]   12/10/2024  7:40 AM Dottie Hickey PTA [facility]   12/13/2024  7:40 AM Marquez Tinsley, PT [facility]

## 2024-12-03 ENCOUNTER — HOSPITAL ENCOUNTER (OUTPATIENT)
Facility: HOSPITAL | Age: 59
Setting detail: RECURRING SERIES
Discharge: HOME | End: 2024-12-06
Payer: COMMERCIAL

## 2024-12-03 PROCEDURE — 97110 THERAPEUTIC EXERCISES: CPT

## 2024-12-03 PROCEDURE — 97112 NEUROMUSCULAR REEDUCATION: CPT

## 2024-12-03 PROCEDURE — 97140 MANUAL THERAPY 1/> REGIONS: CPT

## 2024-12-06 ENCOUNTER — HOSPITAL ENCOUNTER (OUTPATIENT)
Facility: HOSPITAL | Age: 59
Setting detail: RECURRING SERIES
Discharge: HOME | End: 2024-12-09
Payer: COMMERCIAL

## 2024-12-06 PROCEDURE — 97112 NEUROMUSCULAR REEDUCATION: CPT

## 2024-12-06 PROCEDURE — 97140 MANUAL THERAPY 1/> REGIONS: CPT

## 2024-12-06 PROCEDURE — 97110 THERAPEUTIC EXERCISES: CPT

## 2024-12-06 NOTE — PROGRESS NOTES
PHYSICAL / OCCUPATIONAL THERAPY - DAILY TREATMENT NOTE    Patient Name: Jaclyn Lindo    Date: 2024    :   Insurance: Payor: DIANA / Plan: DIANA RPN / Product Type: *No Product type* /      Patient  verified Yes     Visit #   Current / Total 6 16   Time   In / Out 7:00 7:40   Pain   In / Out 0/10 0/10   Subjective Functional Status/Changes: Says shoulder has been feeling good. Was sore Monday after returning to work but feels like she has adjusted good now.    MD 6 week follow up appt 24      TREATMENT AREA =  Right shoulder pain [M25.511]       OBJECTIVE  Modalities Rationale:     decrease inflammation and decrease pain to improve patient's ability to progress to PLOF and address remaining functional goals.     min [] Estim Unattended, type/location:                                      []  w/ice    []  w/heat    min [] Estim Attended, type/location:                                     []  w/US     []  w/ice    []  w/heat    []  TENS insruct      min []  Mechanical Traction: type/lbs                   []  pro   []  sup   []  int   []  cont    []  before manual    []  after manual    min []  Ultrasound, settings/location:      min  unbill []  Ice     []  Heat    location/position:     min []  Paraffin,  details:     min []  Vasopneumatic Device, press/temp:     min []  Whirlpool / Fluido:    If using vaso (only need to measure limb vaso being performed on)      pre-treatment girth :       post-treatment girth :       measured at (landmark location) :      min []  Other:    Skin assessment post-treatment:   Intact        Therapeutic Procedures:  Tx Min Billable or 1:1 Min (if diff from Tx Min) Procedure, Rationale, Specifics   Total    40 Total   [facility] Totals Reminder: bill using total billable min of TIMED therapeutic procedures (example: do not include dry needle or estim unattended, both untimed codes, in totals to left)  8-22 min = 1 unit; 23-37 min = 2 units; 38-52 min = 3 units;

## 2024-12-10 ENCOUNTER — HOSPITAL ENCOUNTER (OUTPATIENT)
Facility: HOSPITAL | Age: 59
Setting detail: RECURRING SERIES
Discharge: HOME | End: 2024-12-13
Payer: COMMERCIAL

## 2024-12-10 PROCEDURE — 97110 THERAPEUTIC EXERCISES: CPT

## 2024-12-10 PROCEDURE — 97140 MANUAL THERAPY 1/> REGIONS: CPT

## 2024-12-10 PROCEDURE — 97112 NEUROMUSCULAR REEDUCATION: CPT

## 2024-12-10 NOTE — PROGRESS NOTES
[facility] - [facility]  [address],[place] [address] - Ph: [phone]  Fx: [phone]  PATIENT STATUS UPDATE  Patient Name: Jaclyn Lindo : 1965   Treatment/Medical Diagnosis: Right shoulder pain [M25.511]   Referral Source: Toni Medina MD     Date of Initial Visit: 24 Attended Visits: 7 Missed Visits: 0     A status update note is completed today for an upcoming follow up with your office on 24 (6 week post-op follow up). A formal reassessment of goals will be completed every 30 days or 10 visits from initial evaluation. All short-term and long-term goals remain appropriate.     CURRENT STATUS  Pt is 6 weeks post op today. Pt is progressing well overall indicated by reaching ROM post-surgical goals. Pain has ranged between 0-3/10. Elevation in pain level with repetitive movement and rolling on to the shoulder in the middle of the night. Hasn't been icing as much at home however, reports HEP compliance.     12/10/24 Current R shoulder AROM in standing:   Flex 145°  °  FIR thumb to T9  CONCEPCION thumb to C7       If you have any questions/comments please contact us directly.  Thank you for allowing us to assist in the care of your patient.    Dottie Hickey, YORDAN       12/10/2024       4:11 PM  Marquez Tinsley PT DPT [facility] Cert-DN    
interventions after completing _ visits    Pt is 6 weeks post op today. Pt is progressing well overal Pain has ranged between 0-3/10. Elevation in pain level with repetitive movement and rolling on to the shoulder in the middle of the night. Hasn't been icing as much at home however, reports HEP compliance.    Current R shoulder AROM in standing:   Flex 145°  °  FIR thumb to T9  CONCEPCION thumb to C7     Patient will continue to benefit from skilled PT / OT services to modify and progress therapeutic interventions, analyze and address functional mobility deficits, analyze and address ROM deficits, analyze and address strength deficits, analyze and address soft tissue restrictions, analyze and cue for proper movement patterns, and analyze and modify for postural abnormalities to address functional deficits and attain remaining goals.    Progress toward goals / Updated goals:  []  See Progress Note/Recertification    NEXT PN DUE RECERT DATE   12/18/24 NA     # AUTH VISITS AUTH EXPIRATION DATE   8 Until used       Short Term Goals: To be accomplished in 4 WEEKS  Pt to report independence and compliance with HEP to allow progress between visits.  PROGRESSING / continuing; reports compliance with basic HEP (12/10/24)  Pt to report pain <3/10 at worst for improved activity tolerance (10/10 at eval).  MET max pain 3/10 (12/10/24)  Pt to demonstrate R shoulder flexion to 145 degrees actively to improve functional mobility (110 deg passive flexion at eval).  12/10/24 PROGRESSING  Current R shoulder AROM in standing:   Flex 145°  °  FIR thumb to T9  CONCEPCION thumb to C7     Long Term Goals: To be accomplished in 8 WEEKS  Pt to improve QuickDASH score to <49% to indicate improved functional ability (68% at eval).  Pt to indicate a GROC of 4+ \"moderately better\" to indicate PLOF.   Pt to report no pain with using mouse and keyboard to improve work tolerance.  Pt to demonstrate >4/5 R shoulder strength on MMT in all planes,

## 2024-12-13 ENCOUNTER — HOSPITAL ENCOUNTER (OUTPATIENT)
Facility: HOSPITAL | Age: 59
Setting detail: RECURRING SERIES
Discharge: HOME | End: 2024-12-16
Payer: COMMERCIAL

## 2024-12-13 PROCEDURE — 97112 NEUROMUSCULAR REEDUCATION: CPT

## 2024-12-13 PROCEDURE — 97110 THERAPEUTIC EXERCISES: CPT

## 2024-12-13 PROCEDURE — 97530 THERAPEUTIC ACTIVITIES: CPT

## 2024-12-13 NOTE — PROGRESS NOTES
PHYSICAL / OCCUPATIONAL THERAPY - DAILY TREATMENT NOTE    Patient Name: Jaclyn Lindo    Date: 2024    :   Insurance: Payor: DIANA / Plan: DIANA RPN / Product Type: *No Product type* /      Patient  verified Yes     Visit #   Current / Total 8 16   Time   In / Out 740  AM   Pain   In / Out 1/10 0/10   Subjective Functional Status/Changes: Pt reports 80% of where patient wants to be. Pt reports improvements in less pain, increased ROM, feels stable. Pt reports continued difficulties in fear of falling again, doing extreme things with the shoulder.   Saw the doctor and is back at full duty.      TREATMENT AREA =  Right shoulder pain [M25.511]       OBJECTIVE  Modalities Rationale:     decrease inflammation and decrease pain to improve patient's ability to progress to PLOF and address remaining functional goals.     min [] Estim Unattended, type/location:                                      []  w/ice    []  w/heat    min [] Estim Attended, type/location:                                     []  w/US     []  w/ice    []  w/heat    []  TENS insruct      min []  Mechanical Traction: type/lbs                   []  pro   []  sup   []  int   []  cont    []  before manual    []  after manual    min []  Ultrasound, settings/location:      min  unbill []  Ice     []  Heat    location/position: To R shoulder in semi recline post-session     min []  Paraffin,  details:     min []  Vasopneumatic Device, press/temp:     min []  Whirlpool / Fluido:    If using vaso (only need to measure limb vaso being performed on)      pre-treatment girth :       post-treatment girth :       measured at (landmark location) :      min []  Other:    Skin assessment post-treatment:   Intact        Therapeutic Procedures:  Tx Min Billable or 1:1 Min (if diff from Tx Min) Procedure, Rationale, Specifics   Total  40 Total    [facility] Totals Reminder: bill using total billable min of TIMED therapeutic procedures (example: do not

## 2024-12-13 NOTE — THERAPY RECERTIFICATION
[facility] - [facility]  [address],[place] [address] - Ph: [phone]  Fx: [phone]  PHYSICAL THERAPY PROGRESS NOTE  Patient Name: Jaclyn Lindo : 1965   Treatment/Medical Diagnosis: Right shoulder pain [M25.511] / M25.511  RIGHT SHOULDER PAIN    Referral Source: Toni Medina MD     Date of Initial Visit: 24 Attended Visits: 8 Missed Visits: 0     SUMMARY OF TREATMENT  Pt seen in clinic for to address Right shoulder pain [M25.511]. Pt has been assessed, completed therapeutic exercises, neuromuscular re-ed, therapeutic functional activity, received manual therapy intervention, self-care strategies, HEP techniques and pt ed on condition consistency and follow-through. -    Subjective: Pt reports 80% of where patient wants to be. Pt reports improvements in less pain, increased ROM, feels stable. Pt reports continued difficulties in fear of falling again, doing extreme things with the shoulder.   Saw the doctor and is back at full duty.     CURRENT STATUS  Pt seen for 8 visits to address Right shoulder chronic dislocations and rehab post op [place]hart repair (DOS 10/28/24). Pt has made significant gains in QOL, ADL, function, range of motion pain control and shoulder ability. Pt does still have right shoulder weakness and lack of end range ROM. I recommend 4 more weeks of rehab (as set in Eval) towards improving strength to avoid re-injury in this post op patient.     GOALS:  Short Term Goals: To be accomplished in 4 WEEKS  Pt to report independence and compliance with HEP to allow progress between visits. Current; reports compliance with basic HEP (24)  Pt to report pain <3/10 at worst for improved activity tolerance (10/10 at eval). MET max pain 3/10 (12/10/24)  Pt to demonstrate R shoulder flexion to 145 degrees actively to improve functional mobility (110 deg passive flexion at eval).  12/10/24 PROGRESSING  Current R

## 2024-12-17 ENCOUNTER — HOSPITAL ENCOUNTER (OUTPATIENT)
Facility: HOSPITAL | Age: 59
Setting detail: RECURRING SERIES
Discharge: HOME | End: 2024-12-20
Payer: COMMERCIAL

## 2024-12-17 PROCEDURE — 97112 NEUROMUSCULAR REEDUCATION: CPT

## 2024-12-17 PROCEDURE — 97110 THERAPEUTIC EXERCISES: CPT

## 2024-12-17 PROCEDURE — 97140 MANUAL THERAPY 1/> REGIONS: CPT

## 2024-12-17 NOTE — PROGRESS NOTES
PHYSICAL / OCCUPATIONAL THERAPY - DAILY TREATMENT NOTE (updated )    Patient Name: Jaclyn Lindo    Date: 2024    : 1965  Insurance: Payor: DIANA / Plan: DIANA RPN / Product Type: *No Product type* /      Patient  verified yes     Visit #   Current / Total 9 16   Time   In / Out 700 am 750 am   Pain   In / Out 0 0   Subjective Functional Status/Changes: Patient reports soreness from turning during the night. Notes no pain just an achy soreness noted.        TREATMENT AREA =  Right shoulder pain [M25.511]    OBJECTIVE    Modalities Rationale:     decrease inflammation and decrease pain to improve patient's ability to progress to PLOF and address remaining functional goals.     min [] Estim Unattended, type/location:                                      []  w/ice    []  w/heat    min [] Estim Attended, type/location:                                     []  w/US     []  w/ice    []  w/heat    []  TENS insruct      min []  Mechanical Traction: type/lbs                   []  pro   []  sup   []  int   []  cont    []  before manual    []  after manual    min []  Ultrasound, settings/location:                                                []  take home patch       []  in clinic   10 min  unbilled [x]  Ice     []  Heat    location/position: Right shoulder; seated    min []  Paraffin,  details:     min []  Vasopneumatic Device, press/temp:     min []  Whirlpool / Fluido:    If using vaso (only need to measure limb vaso being performed on)      pre-treatment girth :       post-treatment girth :       measured at (landmark location) :      min []  Other:    Skin assessment post-treatment (if applicable):    [x]  intact    []  redness- no adverse reaction                 []redness - adverse reaction:        Therapeutic Procedures:  Tx Min Billable or 1:1 Min (if diff from Tx Min) Procedure, Rationale, Specifics   15  21714 Therapeutic Exercise (timed):  increase ROM, strength, coordination, balance, and

## 2024-12-20 ENCOUNTER — HOSPITAL ENCOUNTER (OUTPATIENT)
Facility: HOSPITAL | Age: 59
Setting detail: RECURRING SERIES
Discharge: HOME | End: 2024-12-23
Payer: COMMERCIAL

## 2024-12-20 PROCEDURE — 97530 THERAPEUTIC ACTIVITIES: CPT

## 2024-12-20 PROCEDURE — 97112 NEUROMUSCULAR REEDUCATION: CPT

## 2024-12-20 PROCEDURE — 97140 MANUAL THERAPY 1/> REGIONS: CPT

## 2024-12-20 PROCEDURE — 97110 THERAPEUTIC EXERCISES: CPT

## 2024-12-20 NOTE — PROGRESS NOTES
PHYSICAL / OCCUPATIONAL THERAPY - DAILY TREATMENT NOTE (updated )    Patient Name: Jaclyn Lindo    Date: 2024    : 1965  Insurance: Payor: DIANA / Plan: DIANA [place] / Product Type: *No Product type* /      Patient  verified yes     Visit #   Current / Total 10 16   Time   In / Out 700 am 750 am   Pain   In / Out 0 0   Subjective Functional Status/Changes: Pt reports no pain.     TREATMENT AREA =  Right shoulder pain [M25.511]    OBJECTIVE    Modalities Rationale:     decrease inflammation and decrease pain to improve patient's ability to progress to PLOF and address remaining functional goals.     min [] Estim Unattended, type/location:                                      []  w/ice    []  w/heat    min [] Estim Attended, type/location:                                     []  w/US     []  w/ice    []  w/heat    []  TENS insruct      min []  Mechanical Traction: type/lbs                   []  pro   []  sup   []  int   []  cont    []  before manual    []  after manual    min []  Ultrasound, settings/location:                                                []  take home patch       []  in clinic   10 min  unbilled [x]  Ice     []  Heat    location/position: Right shoulder; seated    min []  Paraffin,  details:     min []  Vasopneumatic Device, press/temp:     min []  Whirlpool / Fluido:    If using vaso (only need to measure limb vaso being performed on)      pre-treatment girth :       post-treatment girth :       measured at (landmark location) :      min []  Other:    Skin assessment post-treatment (if applicable):    [x]  intact    []  redness- no adverse reaction                 []redness - adverse reaction:        Therapeutic Procedures:  Tx Min Billable or 1:1 Min (if diff from Tx Min) Procedure, Rationale, Specifics   12  92104 Therapeutic Exercise (timed):  increase ROM, strength, coordination, balance, and proprioception to improve patient's ability to progress to PLOF and address

## 2024-12-24 ENCOUNTER — HOSPITAL ENCOUNTER (OUTPATIENT)
Facility: HOSPITAL | Age: 59
Setting detail: RECURRING SERIES
Discharge: HOME | End: 2024-12-27
Payer: COMMERCIAL

## 2024-12-24 PROCEDURE — 97140 MANUAL THERAPY 1/> REGIONS: CPT

## 2024-12-24 PROCEDURE — 97110 THERAPEUTIC EXERCISES: CPT

## 2024-12-24 NOTE — PROGRESS NOTES
PHYSICAL / OCCUPATIONAL THERAPY - DAILY TREATMENT NOTE (updated )    Patient Name: Jaclyn Lindo    Date: 2024    : 1965  Insurance: Payor: DIANA / Plan: DIANA RPN / Product Type: *No Product type* /      Patient  verified yes     Visit #   Current / Total 11 16   Time   In / Out 900 am 940 am   Pain   In / Out 2-3 2   Subjective Functional Status/Changes: Patient reports she did a lot of cooking and meal prep yesterday which she thinks may have caused the increased soreness in her shoulder.         TREATMENT AREA =  Right shoulder pain [M25.511]    OBJECTIVE    Therapeutic Procedures:  Tx Min Billable or 1:1 Min (if diff from Tx Min) Procedure, Rationale, Specifics   15  22290 Therapeutic Exercise (timed):  increase ROM, strength, coordination, balance, and proprioception to improve patient's ability to progress to PLOF and address remaining functional goals. (see flow sheet as applicable)     Details if applicable:       25  94121 Manual Therapy (timed):  decrease pain, increase ROM, increase tissue extensibility, and decrease trigger points to improve patient's ability to progress to PLOF and address remaining functional goals.  The manual therapy interventions were performed at a separate and distinct time from the therapeutic activities interventions . (see flow sheet as applicable)     Details if applicable:  PROM to shoulder to all planes to tolerance; STM and DTM to UT, posterior cuff, deltoid, rhomboids   40  [facility] Totals Reminder: bill using total billable min of TIMED therapeutic procedures (example: do not include dry needle or estim unattended, both untimed codes, in totals to left)  8-22 min = 1 unit; 23-37 min = 2 units; 38-52 min = 3 units; 53-67 min = 4 units; 68-82 min = 5 units   Total Total     [x]  Patient Education billed concurrently with other procedures   [x] Review HEP    [] Progressed/Changed HEP, detail:    [] Other detail:       Objective Information/Functional

## 2024-12-27 ENCOUNTER — HOSPITAL ENCOUNTER (OUTPATIENT)
Facility: HOSPITAL | Age: 59
Setting detail: RECURRING SERIES
Discharge: HOME | End: 2024-12-30
Payer: COMMERCIAL

## 2024-12-27 PROCEDURE — 97530 THERAPEUTIC ACTIVITIES: CPT

## 2024-12-27 PROCEDURE — 97112 NEUROMUSCULAR REEDUCATION: CPT

## 2024-12-27 PROCEDURE — 97140 MANUAL THERAPY 1/> REGIONS: CPT

## 2024-12-27 PROCEDURE — 97110 THERAPEUTIC EXERCISES: CPT

## 2024-12-27 NOTE — PROGRESS NOTES
PHYSICAL / OCCUPATIONAL THERAPY - DAILY TREATMENT NOTE (updated )    Patient Name: Jaclyn Lindo    Date: 2024    : 1965  Insurance: Payor: DIANA / Plan: DIANA RPN / Product Type: *No Product type* /      Patient  verified yes     Visit #   Current / Total 12 16   Time   In / Out 900 am 932 am   Pain   In / Out 1 1-2   Subjective Functional Status/Changes: Pt reports the soreness in the shoulder is much better.     TREATMENT AREA =  Right shoulder pain [M25.511]    OBJECTIVE    Therapeutic Procedures:  Tx Min Billable or 1:1 Min (if diff from Tx Min) Procedure, Rationale, Specifics   8  66479 Therapeutic Exercise (timed):  increase ROM, strength, coordination, balance, and proprioception to improve patient's ability to progress to PLOF and address remaining functional goals. (see flow sheet as applicable)     Details if applicable:       8  66822 Neuromuscular Re-Education (timed):  improve balance, coordination, kinesthetic sense, posture, core stability and proprioception to improve patient's ability to develop conscious control of individual muscles and awareness of position of extremities in order to progress to PLOF and address remaining functional goals. (see flow sheet as applicable)     8  85954 Therapeutic Activity (timed):  use of dynamic activities replicating functional movements to increase ROM, strength, coordination, balance, and proprioception in order to improve patient's ability to progress to PLOF and address remaining functional goals.  (see flow sheet as applicable)     8  36798 Manual Therapy (timed):  decrease pain, increase ROM, increase tissue extensibility, and decrease trigger points to improve patient's ability to progress to PLOF and address remaining functional goals.  The manual therapy interventions were performed at a separate and distinct time from the therapeutic activities interventions . (see flow sheet as applicable)     Details if applicable:  PROM to

## 2024-12-30 ENCOUNTER — HOSPITAL ENCOUNTER (OUTPATIENT)
Facility: HOSPITAL | Age: 59
Setting detail: RECURRING SERIES
Discharge: HOME | End: 2025-01-02
Payer: COMMERCIAL

## 2024-12-30 PROCEDURE — 97110 THERAPEUTIC EXERCISES: CPT

## 2024-12-30 PROCEDURE — 97530 THERAPEUTIC ACTIVITIES: CPT

## 2024-12-30 PROCEDURE — 97140 MANUAL THERAPY 1/> REGIONS: CPT

## 2024-12-30 PROCEDURE — 97112 NEUROMUSCULAR REEDUCATION: CPT

## 2024-12-30 NOTE — PROGRESS NOTES
PHYSICAL / OCCUPATIONAL THERAPY - DAILY TREATMENT NOTE (updated )    Patient Name: Jaclyn Lindo    Date: 2024    : 1965  Insurance: Payor: DIANA / Plan: DIANA RPN / Product Type: *No Product type* /      Patient  verified yes     Visit #   Current / Total 13 16   Time   In / Out 1020 AM 1103 AM   Pain   In / Out 0/10 0/10   Subjective Functional Status/Changes: Pt reports feeling good this AM. No problems over the weekend. Patient denies falls or red flags since last visit.     TREATMENT AREA =  Right shoulder pain [M25.511]    OBJECTIVE    Modalities Rationale:     decrease inflammation and decrease pain to improve patient's ability to progress to PLOF and address remaining functional goals.     min [] Estim Unattended, type/location:                                      []  w/ice    []  w/heat    min [] Estim Attended, type/location:                                     []  w/US     []  w/ice    []  w/heat    []  TENS insruct      min []  Mechanical Traction: type/lbs                   []  pro   []  sup   []  int   []  cont    []  before manual    []  after manual    min []  Ultrasound, settings/location:     5 min  unbill [x]  Ice     []  Heat    location/position: To R shoulder in longsit post-session     min []  Paraffin,  details:     min []  Vasopneumatic Device, press/temp:     min []  Whirlpool / Fluido:    If using vaso (only need to measure limb vaso being performed on)      pre-treatment girth :       post-treatment girth :       measured at (landmark location) :      min []  Other:    Skin assessment post-treatment:   Intact      Therapeutic Procedures:  Tx Min Billable or 1:1 Min (if diff from Tx Min) Procedure, Rationale, Specifics   Total  38 Total   [facility] Totals Reminder: bill using total billable min of TIMED therapeutic procedures (example: do not include dry needle or estim unattended, both untimed codes, in totals to left)  8-22 min = 1 unit; 23-37 min = 2 units; 38-52 min

## 2025-01-07 ENCOUNTER — TELEPHONE (OUTPATIENT)
Facility: HOSPITAL | Age: 60
End: 2025-01-07

## 2025-01-07 NOTE — TELEPHONE ENCOUNTER
Patient called requesting to self discharge. She states that she is doing better and will continue her exercises at home. Patient has been informed that a new referral is needed if she wishes to return to PT.